# Patient Record
Sex: FEMALE | Race: WHITE | Employment: OTHER | URBAN - METROPOLITAN AREA
[De-identification: names, ages, dates, MRNs, and addresses within clinical notes are randomized per-mention and may not be internally consistent; named-entity substitution may affect disease eponyms.]

---

## 2018-06-26 ENCOUNTER — HOSPITAL ENCOUNTER (OUTPATIENT)
Age: 53
Discharge: HOME | End: 2018-06-28
Payer: COMMERCIAL

## 2018-06-26 LAB
AMPHETAMINE SCREEN, URINE: NOT DETECTED
BARBITURATE SCREEN URINE: NOT DETECTED
BENZODIAZEPINE SCREEN, URINE: NOT DETECTED
CANNABINOID SCREEN URINE: NOT DETECTED
COCAINE METABOLITE SCREEN URINE: NOT DETECTED
METHADONE SCREEN, URINE: NOT DETECTED
OPIATE SCREEN URINE: POSITIVE
PHENCYCLIDINE SCREEN URINE: NOT DETECTED
PROPOXYPHENE SCREEN: NOT DETECTED

## 2018-06-26 PROCEDURE — G0480 DRUG TEST DEF 1-7 CLASSES: HCPCS

## 2018-06-26 PROCEDURE — 80307 DRUG TEST PRSMV CHEM ANLYZR: CPT

## 2018-06-30 LAB
6AM URINE: <10 NG/ML
CODEINE, URINE: <20 NG/ML
HYDROCODONE, URINE: 464 NG/ML
HYDROMORPHONE, URINE: <20 NG/ML
MORPHINE URINE: <20 NG/ML
NORHYDROCODONE, URINE: 1439 NG/ML
NOROXYCODONE, URINE: <20 NG/ML
NOROXYMORPHONE, URINE: <20 NG/ML
OXYCODONE, URINE CONFIRMATION: <20 NG/ML
OXYMORPHONE, URINE: <20 NG/ML

## 2018-07-02 LAB
Lab: NORMAL
REPORT: NORMAL
THIS TEST SENT TO: NORMAL

## 2018-10-15 ENCOUNTER — HOSPITAL ENCOUNTER (OUTPATIENT)
Age: 53
Discharge: HOME | End: 2018-10-15
Payer: COMMERCIAL

## 2018-10-15 LAB
BUN BLDV-MCNC: 12 MG/DL (ref 6–20)
CREAT SERPL-MCNC: 0.5 MG/DL (ref 0.5–1)
GFR AFRICAN AMERICAN: >60
GFR NON-AFRICAN AMERICAN: >60 ML/MIN/1.73

## 2018-10-15 PROCEDURE — 84520 ASSAY OF UREA NITROGEN: CPT

## 2018-10-15 PROCEDURE — 36415 COLL VENOUS BLD VENIPUNCTURE: CPT

## 2018-10-15 PROCEDURE — 82565 ASSAY OF CREATININE: CPT

## 2018-10-22 ENCOUNTER — HOSPITAL ENCOUNTER (OUTPATIENT)
Age: 53
Discharge: HOME | End: 2018-10-24
Payer: COMMERCIAL

## 2018-10-22 LAB
ALBUMIN SERPL-MCNC: 4.4 G/DL (ref 3.5–5.2)
ALP BLD-CCNC: 81 U/L (ref 35–104)
ALT SERPL-CCNC: 12 U/L (ref 0–32)
AMPHETAMINE SCREEN, URINE: NOT DETECTED
AST SERPL-CCNC: 27 U/L (ref 0–31)
BARBITURATE SCREEN URINE: NOT DETECTED
BENZODIAZEPINE SCREEN, URINE: NOT DETECTED
BILIRUB SERPL-MCNC: 0.5 MG/DL (ref 0–1.2)
BILIRUBIN DIRECT: <0.2 MG/DL (ref 0–0.3)
BILIRUBIN, INDIRECT: ABNORMAL MG/DL (ref 0–1)
BUN BLDV-MCNC: 8 MG/DL (ref 6–20)
CANNABINOID SCREEN URINE: NOT DETECTED
COCAINE METABOLITE SCREEN URINE: NOT DETECTED
CREAT SERPL-MCNC: 0.5 MG/DL (ref 0.5–1)
GFR AFRICAN AMERICAN: >60
GFR NON-AFRICAN AMERICAN: >60 ML/MIN/1.73
METHADONE SCREEN, URINE: NOT DETECTED
OPIATE SCREEN URINE: NOT DETECTED
PHENCYCLIDINE SCREEN URINE: NOT DETECTED
PROPOXYPHENE SCREEN: NOT DETECTED
TOTAL PROTEIN: 8.9 G/DL (ref 6.4–8.3)

## 2018-10-22 PROCEDURE — 82565 ASSAY OF CREATININE: CPT

## 2018-10-22 PROCEDURE — 36415 COLL VENOUS BLD VENIPUNCTURE: CPT

## 2018-10-22 PROCEDURE — G0480 DRUG TEST DEF 1-7 CLASSES: HCPCS

## 2018-10-22 PROCEDURE — 80307 DRUG TEST PRSMV CHEM ANLYZR: CPT

## 2018-10-22 PROCEDURE — 80076 HEPATIC FUNCTION PANEL: CPT

## 2018-10-22 PROCEDURE — 84520 ASSAY OF UREA NITROGEN: CPT

## 2018-10-27 LAB
6AM URINE: <10 NG/ML
CODEINE, URINE: <20 NG/ML
HYDROCODONE, URINE: 155 NG/ML
HYDROMORPHONE, URINE: <20 NG/ML
MORPHINE URINE: <20 NG/ML
NORHYDROCODONE, URINE: 312 NG/ML
NOROXYCODONE, URINE: <20 NG/ML
NOROXYMORPHONE, URINE: <20 NG/ML
OXYCODONE, URINE CONFIRMATION: <20 NG/ML
OXYMORPHONE, URINE: <20 NG/ML

## 2023-04-18 ENCOUNTER — OFFICE VISIT (OUTPATIENT)
Dept: PRIMARY CARE | Facility: CLINIC | Age: 58
End: 2023-04-18
Payer: COMMERCIAL

## 2023-04-18 VITALS
SYSTOLIC BLOOD PRESSURE: 130 MMHG | WEIGHT: 159 LBS | HEART RATE: 90 BPM | HEIGHT: 69 IN | BODY MASS INDEX: 23.55 KG/M2 | DIASTOLIC BLOOD PRESSURE: 80 MMHG | TEMPERATURE: 99.2 F | OXYGEN SATURATION: 98 % | RESPIRATION RATE: 16 BRPM

## 2023-04-18 DIAGNOSIS — G61.81 CHRONIC INFLAMMATORY DEMYELINATING POLYNEUROPATHY (MULTI): Primary | ICD-10-CM

## 2023-04-18 DIAGNOSIS — J40 BRONCHITIS, COMPLICATED: ICD-10-CM

## 2023-04-18 DIAGNOSIS — R91.8 ABNORMAL CT SCAN OF LUNG: ICD-10-CM

## 2023-04-18 DIAGNOSIS — F17.210 CIGARETTE NICOTINE DEPENDENCE WITHOUT COMPLICATION: ICD-10-CM

## 2023-04-18 DIAGNOSIS — M06.9 RHEUMATOID ARTHRITIS INVOLVING MULTIPLE SITES, UNSPECIFIED WHETHER RHEUMATOID FACTOR PRESENT (MULTI): ICD-10-CM

## 2023-04-18 DIAGNOSIS — F11.20 UNCOMPLICATED OPIOID DEPENDENCE (MULTI): ICD-10-CM

## 2023-04-18 PROBLEM — M25.50 ARTHRALGIA: Status: ACTIVE | Noted: 2023-04-18

## 2023-04-18 PROBLEM — E55.9 VITAMIN D DEFICIENCY: Status: ACTIVE | Noted: 2023-04-18

## 2023-04-18 PROBLEM — I10 HYPERTENSION: Status: ACTIVE | Noted: 2023-04-18

## 2023-04-18 PROBLEM — E78.2 COMBINED HYPERLIPIDEMIA: Status: ACTIVE | Noted: 2023-04-18

## 2023-04-18 PROBLEM — J45.909 ASTHMA (HHS-HCC): Status: ACTIVE | Noted: 2023-04-18

## 2023-04-18 PROBLEM — M85.80 OSTEOPENIA: Status: ACTIVE | Noted: 2023-04-18

## 2023-04-18 PROBLEM — E78.5 INCREASED SERUM LIPIDS: Status: ACTIVE | Noted: 2023-04-18

## 2023-04-18 PROBLEM — F33.9 DEPRESSION, RECURRENT (CMS-HCC): Status: ACTIVE | Noted: 2023-04-18

## 2023-04-18 PROBLEM — M54.2 NECK PAIN: Status: ACTIVE | Noted: 2023-04-18

## 2023-04-18 PROBLEM — F41.9 ANXIETY: Status: ACTIVE | Noted: 2023-04-18

## 2023-04-18 PROBLEM — M79.671 BILATERAL FOOT PAIN: Status: ACTIVE | Noted: 2023-04-18

## 2023-04-18 PROBLEM — M79.672 BILATERAL FOOT PAIN: Status: ACTIVE | Noted: 2023-04-18

## 2023-04-18 PROCEDURE — 4004F PT TOBACCO SCREEN RCVD TLK: CPT | Performed by: FAMILY MEDICINE

## 2023-04-18 PROCEDURE — 3079F DIAST BP 80-89 MM HG: CPT | Performed by: FAMILY MEDICINE

## 2023-04-18 PROCEDURE — 3075F SYST BP GE 130 - 139MM HG: CPT | Performed by: FAMILY MEDICINE

## 2023-04-18 PROCEDURE — 99214 OFFICE O/P EST MOD 30 MIN: CPT | Performed by: FAMILY MEDICINE

## 2023-04-18 RX ORDER — HYDROCODONE BITARTRATE AND ACETAMINOPHEN 7.5; 325 MG/1; MG/1
1 TABLET ORAL EVERY 6 HOURS PRN
Qty: 120 TABLET | Refills: 0 | Status: SHIPPED | OUTPATIENT
Start: 2023-04-20 | End: 2023-04-25 | Stop reason: SDUPTHER

## 2023-04-18 RX ORDER — ALBUTEROL SULFATE 90 UG/1
2 AEROSOL, METERED RESPIRATORY (INHALATION)
COMMUNITY
Start: 2022-06-01

## 2023-04-18 RX ORDER — PREDNISONE 5 MG/1
1 TABLET ORAL 3 TIMES DAILY
COMMUNITY
Start: 2022-05-05 | End: 2023-07-12 | Stop reason: ENTERED-IN-ERROR

## 2023-04-18 RX ORDER — CYCLOBENZAPRINE HCL 10 MG
10 TABLET ORAL 2 TIMES DAILY PRN
COMMUNITY
End: 2023-04-24

## 2023-04-18 RX ORDER — HYDROCODONE BITARTRATE AND ACETAMINOPHEN 7.5; 325 MG/1; MG/1
1 TABLET ORAL EVERY 6 HOURS PRN
COMMUNITY
End: 2023-04-18 | Stop reason: SDUPTHER

## 2023-04-18 RX ORDER — HYDROCODONE BITARTRATE AND ACETAMINOPHEN 7.5; 325 MG/1; MG/1
1 TABLET ORAL EVERY 6 HOURS PRN
Qty: 120 TABLET | Refills: 0 | Status: SHIPPED | OUTPATIENT
Start: 2023-05-20 | End: 2023-06-19

## 2023-04-18 RX ORDER — NALOXONE HYDROCHLORIDE 4 MG/.1ML
4 SPRAY NASAL AS NEEDED
Qty: 1 EACH | Refills: 0 | Status: SHIPPED | OUTPATIENT
Start: 2023-04-18

## 2023-04-18 RX ORDER — LOSARTAN POTASSIUM AND HYDROCHLOROTHIAZIDE 25; 100 MG/1; MG/1
1 TABLET ORAL DAILY
COMMUNITY
End: 2023-08-11

## 2023-04-18 RX ORDER — ACETAMINOPHEN 500 MG
50 TABLET ORAL DAILY
COMMUNITY
End: 2023-10-13 | Stop reason: WASHOUT

## 2023-04-18 RX ORDER — ALBUTEROL SULFATE 0.63 MG/3ML
0.63 SOLUTION RESPIRATORY (INHALATION) EVERY 4 HOURS PRN
COMMUNITY
Start: 2013-08-09 | End: 2023-10-13 | Stop reason: WASHOUT

## 2023-04-18 RX ORDER — AMLODIPINE BESYLATE 5 MG/1
1 TABLET ORAL DAILY
COMMUNITY
Start: 2023-02-01 | End: 2023-07-25

## 2023-04-18 RX ORDER — BUPROPION HYDROCHLORIDE 150 MG/1
150 TABLET ORAL EVERY MORNING
Qty: 30 TABLET | Refills: 5 | Status: SHIPPED | OUTPATIENT
Start: 2023-04-18 | End: 2023-05-15

## 2023-04-18 RX ORDER — HYDROCODONE BITARTRATE AND ACETAMINOPHEN 7.5; 325 MG/1; MG/1
1 TABLET ORAL EVERY 6 HOURS PRN
Qty: 120 TABLET | Refills: 0 | Status: SHIPPED | OUTPATIENT
Start: 2023-06-19 | End: 2023-06-26 | Stop reason: SDUPTHER

## 2023-04-18 RX ORDER — HYDROCHLOROTHIAZIDE 25 MG/1
1 TABLET ORAL DAILY
COMMUNITY
Start: 2022-05-16 | End: 2023-10-13 | Stop reason: WASHOUT

## 2023-04-18 RX ORDER — CEPHALEXIN 250 MG/1
1 CAPSULE ORAL
COMMUNITY
Start: 2013-08-09

## 2023-04-18 RX ORDER — EPINEPHRINE 0.3 MG/.3ML
1 INJECTION SUBCUTANEOUS AS NEEDED
COMMUNITY
Start: 2018-01-30

## 2023-04-18 ASSESSMENT — ENCOUNTER SYMPTOMS
WHEEZING: 1
BACK PAIN: 1
COUGH: 1
SHORTNESS OF BREATH: 1
ARTHRALGIAS: 1

## 2023-04-18 NOTE — PROGRESS NOTES
Subjective   Patient ID: La Donnelly is a 57 y.o. female.    Nicotine Dependence      57year old female for follow up. Discussed smoking cessation, Has days of not sleeping, pain 3-4.Meds reconciled.   OARRS:  Laura Ward MD on 4/18/2023 10:49 AM  I have personally reviewed the OARRS report for La Donnelly. I have considered the risks of abuse, dependence, addiction and diversion and I believe that it is clinically appropriate for La Donnelly to be prescribed this medication    Is the patient prescribed a combination of a benzodiazepine and opioid?  No    Last Urine Drug Screen / ordered today: Yes  Recent Results (from the past 12643 hour(s))   OPIATE/OPIOID/BENZO PRESCRIPTION COMPLIANCE    Collection Time: 02/01/23 10:47 AM   Result Value Ref Range    DRUG SCREEN COMMENT URINE SEE BELOW     Creatine, Urine 26.1 mg/dL    Amphetamine Screen, Urine PRESUMPTIVE NEGATIVE NEGATIVE    Barbiturate Screen, Urine PRESUMPTIVE NEGATIVE NEGATIVE    Cannabinoid Screen, Urine PRESUMPTIVE NEGATIVE NEGATIVE    Cocaine Screen, Urine PRESUMPTIVE NEGATIVE NEGATIVE    PCP Screen, Urine PRESUMPTIVE NEGATIVE NEGATIVE    7-Aminoclonazepam <25 Cutoff <25 ng/mL    Alpha-Hydroxyalprazolam <25 Cutoff <25 ng/mL    Alpha-Hydroxymidazolam <25 Cutoff <25 ng/mL    Alprazolam <25 Cutoff <25 ng/mL    Chlordiazepoxide <25 Cutoff <25 ng/mL    Clonazepam <25 Cutoff <25 ng/mL    Diazepam <25 Cutoff <25 ng/mL    Lorazepam <25 Cutoff <25 ng/mL    Midazolam <25 Cutoff <25 ng/mL    Nordiazepam <25 Cutoff <25 ng/mL    Oxazepam <25 Cutoff <25 ng/mL    Temazepam <25 Cutoff <25 ng/mL    Zolpidem <25 Cutoff <25 ng/mL    Zolpidem Metabolite (ZCA) <25 Cutoff <25 ng/mL    6-Acetylmorphine <25 Cutoff <25 ng/mL    Codeine <50 Cutoff <50 ng/mL    Hydrocodone 904 (A) Cutoff <25 ng/mL    Hydromorphone 663 (A) Cutoff <25 ng/mL    Morphine Urine <50 Cutoff <50 ng/mL    Norhydrocodone 950 (A) Cutoff <25 ng/mL    Noroxycodone <25 Cutoff <25 ng/mL     Oxycodone <25 Cutoff <25 ng/mL    Oxymorphone <25 Cutoff <25 ng/mL    Tramadol <50 Cutoff <50 ng/mL    O-Desmethyltramadol <50 Cutoff <50 ng/mL    Fentanyl <2.5 Cutoff<2.5 ng/mL    Norfentanyl <2.5 Cutoff<2.5 ng/mL    METHADONE CONFIRMATION,URINE <25 Cutoff <25 ng/mL    EDDP <25 Cutoff <25 ng/mL     Results are as expected.     Controlled Substance Agreement:  Date of the Last Agreement: 02/07/2023  Reviewed Controlled Substance Agreement including but not limited to the benefits, risks, and alternatives to treatment with a Controlled Substance medication(s).    Opioids:  What is the patient's goal of therapy? Improved pain  Is this being achieved with current treatment? yes    I have calculated the patient's Morphine Dose Equivalent (MED):   I have considered referral to Pain Management and/or a specialist, and do not feel it is necessary at this time.    I feel that it is clinically indicated to continue this current medication regimen after consideration of alternative therapies, and other non-opioid treatment.    Opioid Risk Screening:  No data recorded    Pain Assessment: 3/10    Review of Systems   Respiratory:  Positive for cough, shortness of breath and wheezing.    Musculoskeletal:  Positive for arthralgias and back pain.   All other systems reviewed and are negative.      Objective   Physical Exam  Vitals reviewed.   Constitutional:       Appearance: Normal appearance.   HENT:      Head: Normocephalic and atraumatic.      Right Ear: Tympanic membrane normal.      Left Ear: Tympanic membrane normal.      Nose: Nose normal.      Mouth/Throat:      Mouth: Mucous membranes are moist.      Pharynx: Oropharynx is clear.   Eyes:      Extraocular Movements: Extraocular movements intact.      Conjunctiva/sclera: Conjunctivae normal.      Pupils: Pupils are equal, round, and reactive to light.   Cardiovascular:      Rate and Rhythm: Normal rate and regular rhythm.      Pulses: Normal pulses.      Heart sounds:  Normal heart sounds.   Pulmonary:      Effort: Pulmonary effort is normal.      Breath sounds: Wheezing and rhonchi present.   Abdominal:      General: Abdomen is flat. Bowel sounds are normal.      Palpations: Abdomen is soft.   Musculoskeletal:         General: Normal range of motion.      Cervical back: Normal range of motion and neck supple.   Skin:     General: Skin is warm and dry.      Capillary Refill: Capillary refill takes less than 2 seconds.   Neurological:      General: No focal deficit present.      Mental Status: She is alert and oriented to person, place, and time.   Psychiatric:         Mood and Affect: Mood normal.         Behavior: Behavior normal.         Assessment/Plan   Diagnoses and all orders for this visit:  Chronic inflammatory demyelinating polyneuropathy (CMS/HCC)  -     HYDROcodone-acetaminophen (Norco) 7.5-325 mg tablet; Take 1 tablet by mouth every 6 hours if needed for severe pain (7 - 10). Do not start before April 20, 2023.  -     HYDROcodone-acetaminophen (Norco) 7.5-325 mg tablet; Take 1 tablet by mouth every 6 hours if needed for severe pain (7 - 10). Do not start before May 20, 2023.  -     HYDROcodone-acetaminophen (Norco) 7.5-325 mg tablet; Take 1 tablet by mouth every 6 hours if needed for severe pain (7 - 10). Do not start before June 19, 2023.  Cigarette nicotine dependence without complication  -     buPROPion XL (Wellbutrin XL) 150 mg 24 hr tablet; Take 1 tablet (150 mg) by mouth once daily in the morning. Do not crush, chew, or split.  Uncomplicated opioid dependence (CMS/HCC)  -     naloxone (Narcan) 4 mg/0.1 mL nasal spray; Administer 1 spray (4 mg) into affected nostril(s) if needed for opioid reversal for up to 1 dose. May repeat every 2-3 minutes if needed, alternating nostrils, until medical assistance becomes available.  Bronchitis, complicated  Abnormal CT scan of lung  Rheumatoid arthritis involving multiple sites, unspecified whether rheumatoid factor present  (CMS/[facility])    Discussed smoking cessation, try lozenges or patches at higher dose to start, then wean over time, 1-2 weeks at each dose. Bupropion sent, information given. Good luck.     Colonoscopy due this summer.

## 2023-04-18 NOTE — PATIENT INSTRUCTIONS
Assessment/Plan   Diagnoses and all orders for this visit:  Chronic inflammatory demyelinating polyneuropathy (CMS/HCC)  -     HYDROcodone-acetaminophen (Norco) 7.5-325 mg tablet; Take 1 tablet by mouth every 6 hours if needed for severe pain (7 - 10). Do not start before April 20, 2023.  -     HYDROcodone-acetaminophen (Norco) 7.5-325 mg tablet; Take 1 tablet by mouth every 6 hours if needed for severe pain (7 - 10). Do not start before May 20, 2023.  -     HYDROcodone-acetaminophen (Norco) 7.5-325 mg tablet; Take 1 tablet by mouth every 6 hours if needed for severe pain (7 - 10). Do not start before June 19, 2023.  Cigarette nicotine dependence without complication  -     buPROPion XL (Wellbutrin XL) 150 mg 24 hr tablet; Take 1 tablet (150 mg) by mouth once daily in the morning. Do not crush, chew, or split.  Uncomplicated opioid dependence (CMS/[facility])  -     naloxone (Narcan) 4 mg/0.1 mL nasal spray; Administer 1 spray (4 mg) into affected nostril(s) if needed for opioid reversal for up to 1 dose. May repeat every 2-3 minutes if needed, alternating nostrils, until medical assistance becomes available.  Bronchitis, complicated  Abnormal CT scan of lung  Rheumatoid arthritis involving multiple sites, unspecified whether rheumatoid factor present (CMS/[facility])    Discussed smoking cessation, try lozenges or patches at higher dose to start, then wean over time, 1-2 weeks at each dose. Bupropion sent, information given. Good luck.     Colonoscopy due this summer.

## 2023-04-21 DIAGNOSIS — M54.2 CERVICALGIA: ICD-10-CM

## 2023-04-24 ENCOUNTER — TELEPHONE (OUTPATIENT)
Dept: PRIMARY CARE | Facility: CLINIC | Age: 58
End: 2023-04-24
Payer: MEDICARE

## 2023-04-24 DIAGNOSIS — G61.81 CHRONIC INFLAMMATORY DEMYELINATING POLYNEUROPATHY (MULTI): ICD-10-CM

## 2023-04-24 PROBLEM — F17.210 CIGARETTE SMOKER: Status: ACTIVE | Noted: 2022-06-21

## 2023-04-24 PROBLEM — M21.541 ACQUIRED EQUINOVARUS DEFORMITY OF RIGHT FOOT: Status: ACTIVE | Noted: 2023-04-24

## 2023-04-24 PROBLEM — J44.9 CHRONIC OBSTRUCTIVE LUNG DISEASE (MULTI): Status: ACTIVE | Noted: 2022-06-21

## 2023-04-24 PROBLEM — R29.898 LEG WEAKNESS: Status: ACTIVE | Noted: 2023-04-24

## 2023-04-24 PROBLEM — J45.909 ASTHMA WITHOUT STATUS ASTHMATICUS (HHS-HCC): Status: ACTIVE | Noted: 2022-06-21

## 2023-04-24 PROBLEM — J18.9 PNEUMONIA: Status: ACTIVE | Noted: 2022-11-28

## 2023-04-24 PROBLEM — R93.89 ABNORMAL COMPUTERIZED AXIAL TOMOGRAPHY OF CHEST: Status: ACTIVE | Noted: 2022-06-21

## 2023-04-24 PROBLEM — M54.10 RADICULAR PAIN: Status: ACTIVE | Noted: 2023-04-24

## 2023-04-24 PROBLEM — M21.542 ACQUIRED EQUINOVARUS DEFORMITY OF LEFT FOOT: Status: ACTIVE | Noted: 2023-04-24

## 2023-04-24 RX ORDER — CYCLOBENZAPRINE HCL 10 MG
TABLET ORAL
Qty: 180 TABLET | Refills: 1 | Status: SHIPPED | OUTPATIENT
Start: 2023-04-24 | End: 2024-04-02 | Stop reason: SDUPTHER

## 2023-04-24 NOTE — TELEPHONE ENCOUNTER
Pt cannot find pharmacy that has [place] in stock. Was supposed to be filled last Thurs.   Please advise.

## 2023-04-25 RX ORDER — HYDROCODONE BITARTRATE AND ACETAMINOPHEN 7.5; 325 MG/1; MG/1
1 TABLET ORAL EVERY 6 HOURS PRN
Qty: 120 TABLET | Refills: 0 | Status: SHIPPED | OUTPATIENT
Start: 2023-04-25 | End: 2023-05-25

## 2023-05-13 DIAGNOSIS — F17.210 CIGARETTE NICOTINE DEPENDENCE WITHOUT COMPLICATION: ICD-10-CM

## 2023-05-15 RX ORDER — BUPROPION HYDROCHLORIDE 150 MG/1
150 TABLET ORAL EVERY MORNING
Qty: 30 TABLET | Refills: 5 | Status: SHIPPED | OUTPATIENT
Start: 2023-05-15 | End: 2023-10-13 | Stop reason: WASHOUT

## 2023-06-26 ENCOUNTER — TELEPHONE (OUTPATIENT)
Dept: PRIMARY CARE | Facility: CLINIC | Age: 58
End: 2023-06-26
Payer: MEDICARE

## 2023-06-26 DIAGNOSIS — G61.81 CHRONIC INFLAMMATORY DEMYELINATING POLYNEUROPATHY (MULTI): ICD-10-CM

## 2023-06-26 RX ORDER — HYDROCODONE BITARTRATE AND ACETAMINOPHEN 7.5; 325 MG/1; MG/1
1 TABLET ORAL EVERY 6 HOURS PRN
Qty: 120 TABLET | Refills: 0 | Status: SHIPPED | OUTPATIENT
Start: 2023-06-26 | End: 2023-07-12 | Stop reason: SDUPTHER

## 2023-07-12 ENCOUNTER — OFFICE VISIT (OUTPATIENT)
Dept: PRIMARY CARE | Facility: CLINIC | Age: 58
End: 2023-07-12
Payer: MEDICARE

## 2023-07-12 VITALS
BODY MASS INDEX: 23.67 KG/M2 | HEIGHT: 69 IN | OXYGEN SATURATION: 97 % | SYSTOLIC BLOOD PRESSURE: 138 MMHG | HEART RATE: 69 BPM | WEIGHT: 159.8 LBS | TEMPERATURE: 97.7 F | RESPIRATION RATE: 16 BRPM | DIASTOLIC BLOOD PRESSURE: 78 MMHG

## 2023-07-12 DIAGNOSIS — F17.210 CIGARETTE NICOTINE DEPENDENCE WITHOUT COMPLICATION: ICD-10-CM

## 2023-07-12 DIAGNOSIS — R29.898 WEAKNESS OF BOTH LOWER EXTREMITIES: ICD-10-CM

## 2023-07-12 DIAGNOSIS — M79.672 BILATERAL FOOT PAIN: ICD-10-CM

## 2023-07-12 DIAGNOSIS — I10 PRIMARY HYPERTENSION: ICD-10-CM

## 2023-07-12 DIAGNOSIS — F17.211 NICOTINE DEPENDENCE, CIGARETTES, IN REMISSION: ICD-10-CM

## 2023-07-12 DIAGNOSIS — E78.2 COMBINED HYPERLIPIDEMIA: Primary | ICD-10-CM

## 2023-07-12 DIAGNOSIS — G61.81 CHRONIC INFLAMMATORY DEMYELINATING POLYNEUROPATHY (MULTI): ICD-10-CM

## 2023-07-12 DIAGNOSIS — Z12.31 VISIT FOR SCREENING MAMMOGRAM: ICD-10-CM

## 2023-07-12 DIAGNOSIS — F11.20 UNCOMPLICATED OPIOID DEPENDENCE (MULTI): ICD-10-CM

## 2023-07-12 DIAGNOSIS — M25.50 ARTHRALGIA, UNSPECIFIED JOINT: ICD-10-CM

## 2023-07-12 DIAGNOSIS — M79.671 BILATERAL FOOT PAIN: ICD-10-CM

## 2023-07-12 DIAGNOSIS — J30.1 SEASONAL ALLERGIC RHINITIS DUE TO POLLEN: ICD-10-CM

## 2023-07-12 DIAGNOSIS — F41.9 ANXIETY: ICD-10-CM

## 2023-07-12 DIAGNOSIS — M06.9 RHEUMATOID ARTHRITIS INVOLVING MULTIPLE SITES, UNSPECIFIED WHETHER RHEUMATOID FACTOR PRESENT (MULTI): ICD-10-CM

## 2023-07-12 DIAGNOSIS — E55.9 VITAMIN D DEFICIENCY: ICD-10-CM

## 2023-07-12 DIAGNOSIS — J44.9 CHRONIC OBSTRUCTIVE PULMONARY DISEASE, UNSPECIFIED COPD TYPE (MULTI): ICD-10-CM

## 2023-07-12 DIAGNOSIS — J45.30 MILD PERSISTENT ASTHMA, UNSPECIFIED WHETHER COMPLICATED (HHS-HCC): ICD-10-CM

## 2023-07-12 DIAGNOSIS — F33.9 DEPRESSION, RECURRENT (CMS-HCC): ICD-10-CM

## 2023-07-12 PROCEDURE — 3078F DIAST BP <80 MM HG: CPT | Performed by: FAMILY MEDICINE

## 2023-07-12 PROCEDURE — 4004F PT TOBACCO SCREEN RCVD TLK: CPT | Performed by: FAMILY MEDICINE

## 2023-07-12 PROCEDURE — 99214 OFFICE O/P EST MOD 30 MIN: CPT | Performed by: FAMILY MEDICINE

## 2023-07-12 PROCEDURE — 3075F SYST BP GE 130 - 139MM HG: CPT | Performed by: FAMILY MEDICINE

## 2023-07-12 RX ORDER — HYDROCODONE BITARTRATE AND ACETAMINOPHEN 7.5; 325 MG/1; MG/1
1 TABLET ORAL EVERY 6 HOURS PRN
Qty: 120 TABLET | Refills: 0 | Status: SHIPPED | OUTPATIENT
Start: 2023-08-25 | End: 2023-09-24

## 2023-07-12 RX ORDER — PREDNISONE 1 MG/1
3 TABLET ORAL DAILY
Qty: 90 TABLET | Refills: 2 | Status: SHIPPED | OUTPATIENT
Start: 2023-07-12 | End: 2023-10-10

## 2023-07-12 RX ORDER — HYDROCODONE BITARTRATE AND ACETAMINOPHEN 7.5; 325 MG/1; MG/1
1 TABLET ORAL EVERY 6 HOURS PRN
Qty: 120 TABLET | Refills: 0 | Status: SHIPPED | OUTPATIENT
Start: 2023-09-24 | End: 2023-10-13 | Stop reason: SDUPTHER

## 2023-07-12 RX ORDER — HYDROCODONE BITARTRATE AND ACETAMINOPHEN 7.5; 325 MG/1; MG/1
1 TABLET ORAL EVERY 6 HOURS PRN
Qty: 120 TABLET | Refills: 0 | Status: SHIPPED | OUTPATIENT
Start: 2023-07-26 | End: 2023-08-25

## 2023-07-12 ASSESSMENT — PATIENT HEALTH QUESTIONNAIRE - PHQ9
2. FEELING DOWN, DEPRESSED OR HOPELESS: NOT AT ALL
1. LITTLE INTEREST OR PLEASURE IN DOING THINGS: NOT AT ALL
SUM OF ALL RESPONSES TO PHQ9 QUESTIONS 1 & 2: 0

## 2023-07-12 ASSESSMENT — LIFESTYLE VARIABLES
HOW MANY STANDARD DRINKS CONTAINING ALCOHOL DO YOU HAVE ON A TYPICAL DAY: PATIENT DOES NOT DRINK
SKIP TO QUESTIONS 9-10: 1
AUDIT-C TOTAL SCORE: 0
HOW OFTEN DO YOU HAVE SIX OR MORE DRINKS ON ONE OCCASION: NEVER
HOW OFTEN DO YOU HAVE A DRINK CONTAINING ALCOHOL: NEVER

## 2023-07-12 ASSESSMENT — ENCOUNTER SYMPTOMS
OCCASIONAL FEELINGS OF UNSTEADINESS: 0
MYALGIAS: 1
ARTHRALGIAS: 1
LOSS OF SENSATION IN FEET: 1
DEPRESSION: 0

## 2023-07-12 NOTE — PATIENT INSTRUCTIONS
Diagnoses and all orders for this visit:  Combined hyperlipidemia  -     BI mammo bilateral screening tomosynthesis; Future  -     CT lung screening low dose; Future  -     CBC and Auto Differential; Future  -     Comprehensive Metabolic Panel; Future  -     Lipid Panel; Future  -     TSH with reflex to Free T4 if abnormal; Future  Primary hypertension  -     BI mammo bilateral screening tomosynthesis; Future  -     CT lung screening low dose; Future  -     CBC and Auto Differential; Future  -     Comprehensive Metabolic Panel; Future  -     Lipid Panel; Future  -     TSH with reflex to Free T4 if abnormal; Future  Seasonal allergic rhinitis due to pollen  Vitamin D deficiency  Depression, recurrent (CMS/HCC)  Anxiety  Uncomplicated opioid dependence (CMS/HCC)  Rheumatoid arthritis involving multiple sites, unspecified whether rheumatoid factor present (CMS/HCC)  -     predniSONE 1 mg tablet,delayed release (DR/EC); Take 3 mg by mouth once daily.  -     BI mammo bilateral screening tomosynthesis; Future  -     CT lung screening low dose; Future  -     CBC and Auto Differential; Future  -     Comprehensive Metabolic Panel; Future  -     Lipid Panel; Future  -     TSH with reflex to Free T4 if abnormal; Future  Bilateral foot pain  -     HYDROcodone-acetaminophen (Norco) 7.5-325 mg tablet; Take 1 tablet by mouth every 6 hours if needed for severe pain (7 - 10). Do not start before August 25, 2023.  -     HYDROcodone-acetaminophen (Norco) 7.5-325 mg tablet; Take 1 tablet by mouth every 6 hours if needed for severe pain (7 - 10). Do not start before September 24, 2023.  Arthralgia, unspecified joint  -     HYDROcodone-acetaminophen (Norco) 7.5-325 mg tablet; Take 1 tablet by mouth every 6 hours if needed for severe pain (7 - 10). Do not start before August 25, 2023.  -     HYDROcodone-acetaminophen (Norco) 7.5-325 mg tablet; Take 1 tablet by mouth every 6 hours if needed for severe pain (7 - 10). Do not start before  September 24, 2023.  Weakness of both lower extremities  -     HYDROcodone-acetaminophen (Norco) 7.5-325 mg tablet; Take 1 tablet by mouth every 6 hours if needed for severe pain (7 - 10). Do not start before August 25, 2023.  -     HYDROcodone-acetaminophen (Norco) 7.5-325 mg tablet; Take 1 tablet by mouth every 6 hours if needed for severe pain (7 - 10). Do not start before September 24, 2023.  Chronic inflammatory demyelinating polyneuropathy (CMS/HCC)  -     predniSONE 1 mg tablet,delayed release (DR/EC); Take 3 mg by mouth once daily.  -     HYDROcodone-acetaminophen (Norco) 7.5-325 mg tablet; Take 1 tablet by mouth every 6 hours if needed for severe pain (7 - 10). Do not start before July 26, 2023.  -     HYDROcodone-acetaminophen (Norco) 7.5-325 mg tablet; Take 1 tablet by mouth every 6 hours if needed for severe pain (7 - 10). Do not start before August 25, 2023.  -     HYDROcodone-acetaminophen (Norco) 7.5-325 mg tablet; Take 1 tablet by mouth every 6 hours if needed for severe pain (7 - 10). Do not start before September 24, 2023.  Mild persistent asthma, unspecified whether complicated  Chronic obstructive pulmonary disease, unspecified COPD type (CMS/HCC)  -     BI mammo bilateral screening tomosynthesis; Future  -     CT lung screening low dose; Future  -     CBC and Auto Differential; Future  -     Comprehensive Metabolic Panel; Future  -     Lipid Panel; Future  -     TSH with reflex to Free T4 if abnormal; Future  Cigarette nicotine dependence without complication  Nicotine dependence, cigarettes, in remission  -     CT lung screening low dose; Future  Visit for screening mammogram  -     BI mammo bilateral screening tomosynthesis; Future    Schedule colonoscopy at your convenience.

## 2023-07-12 NOTE — PROGRESS NOTES
OARRS:  Laura Ward MD on 7/12/2023 10:46 AM  I have personally reviewed the OARRS report for La Donnelly. I have considered the risks of abuse, dependence, addiction and diversion and I believe that it is clinically appropriate for La Donnelly to be prescribed this medication    Is the patient prescribed a combination of a benzodiazepine and opioid?  Yes, I feel it is clincially indicated to continue the medication and have discussed with the patient risks/benefits/alternatives.    Last Urine Drug Screen / ordered today: Yes  Recent Results (from the past 31610 hour(s))   OPIATE/OPIOID/BENZO PRESCRIPTION COMPLIANCE    Collection Time: 02/01/23 10:47 AM   Result Value Ref Range    DRUG SCREEN COMMENT URINE SEE BELOW     Creatine, Urine 26.1 mg/dL    Amphetamine Screen, Urine PRESUMPTIVE NEGATIVE NEGATIVE    Barbiturate Screen, Urine PRESUMPTIVE NEGATIVE NEGATIVE    Cannabinoid Screen, Urine PRESUMPTIVE NEGATIVE NEGATIVE    Cocaine Screen, Urine PRESUMPTIVE NEGATIVE NEGATIVE    PCP Screen, Urine PRESUMPTIVE NEGATIVE NEGATIVE    7-Aminoclonazepam <25 Cutoff <25 ng/mL    Alpha-Hydroxyalprazolam <25 Cutoff <25 ng/mL    Alpha-Hydroxymidazolam <25 Cutoff <25 ng/mL    Alprazolam <25 Cutoff <25 ng/mL    Chlordiazepoxide <25 Cutoff <25 ng/mL    Clonazepam <25 Cutoff <25 ng/mL    Diazepam <25 Cutoff <25 ng/mL    Lorazepam <25 Cutoff <25 ng/mL    Midazolam <25 Cutoff <25 ng/mL    Nordiazepam <25 Cutoff <25 ng/mL    Oxazepam <25 Cutoff <25 ng/mL    Temazepam <25 Cutoff <25 ng/mL    Zolpidem <25 Cutoff <25 ng/mL    Zolpidem Metabolite (ZCA) <25 Cutoff <25 ng/mL    6-Acetylmorphine <25 Cutoff <25 ng/mL    Codeine <50 Cutoff <50 ng/mL    Hydrocodone 904 (A) Cutoff <25 ng/mL    Hydromorphone 663 (A) Cutoff <25 ng/mL    Morphine Urine <50 Cutoff <50 ng/mL    Norhydrocodone 950 (A) Cutoff <25 ng/mL    Noroxycodone <25 Cutoff <25 ng/mL    Oxycodone <25 Cutoff <25 ng/mL    Oxymorphone <25 Cutoff <25 ng/mL    Tramadol <50  Cutoff <50 ng/mL    O-Desmethyltramadol <50 Cutoff <50 ng/mL    Fentanyl <2.5 Cutoff<2.5 ng/mL    Norfentanyl <2.5 Cutoff<2.5 ng/mL    METHADONE CONFIRMATION,URINE <25 Cutoff <25 ng/mL    EDDP <25 Cutoff <25 ng/mL     Results are as expected.     Controlled Substance Agreement:  Date of the Last Agreement: 04/18/2023  Reviewed Controlled Substance Agreement including but not limited to the benefits, risks, and alternatives to treatment with a Controlled Substance medication(s).    Opioids:  What is the patient's goal of therapy? Improved pain  Is this being achieved with current treatment? yes    I have calculated the patient's Morphine Dose Equivalent (MED):   I have considered referral to Pain Management and/or a specialist, and do not feel it is necessary at this time.    I feel that it is clinically indicated to continue this current medication regimen after consideration of alternative therapies, and other non-opioid treatment.    Opioid Risk Screening:  No data recorded    Pain Assessment:  3-4/10 most times  Subjective   Patient ID: La Donnelly is a 57 y.o. female.    Med Refill  Associated symptoms include arthralgias and myalgias.     57 year old female for follow up. She is having difficulty with both legs bowing in, and elbows to fingertips, Pain 3-4, [facility] has medications.   Review of Systems   Musculoskeletal:  Positive for arthralgias, gait problem and myalgias.   All other systems reviewed and are negative.      Objective   Physical Exam  Vitals reviewed.   Constitutional:       Appearance: Normal appearance.   HENT:      Head: Normocephalic and atraumatic.      Right Ear: Tympanic membrane normal.      Left Ear: Tympanic membrane normal.      Nose: Nose normal.      Mouth/Throat:      Mouth: Mucous membranes are moist.      Pharynx: Oropharynx is clear.   Eyes:      Extraocular Movements: Extraocular movements intact.      Conjunctiva/sclera: Conjunctivae normal.      Pupils: Pupils are equal,  round, and reactive to light.   Cardiovascular:      Rate and Rhythm: Normal rate and regular rhythm.      Pulses: Normal pulses.      Heart sounds: Normal heart sounds.   Pulmonary:      Effort: Pulmonary effort is normal.      Breath sounds: Normal breath sounds.   Abdominal:      General: Abdomen is flat. Bowel sounds are normal.      Palpations: Abdomen is soft.   Musculoskeletal:         General: Normal range of motion.      Cervical back: Normal range of motion and neck supple.   Skin:     General: Skin is warm and dry.      Capillary Refill: Capillary refill takes less than 2 seconds.   Neurological:      General: No focal deficit present.      Mental Status: She is alert and oriented to person, place, and time.   Psychiatric:         Mood and Affect: Mood normal.         Behavior: Behavior normal.         Assessment/Plan   Diagnoses and all orders for this visit:  Combined hyperlipidemia  -     BI mammo bilateral screening tomosynthesis; Future  -     CT lung screening low dose; Future  -     CBC and Auto Differential; Future  -     Comprehensive Metabolic Panel; Future  -     Lipid Panel; Future  -     TSH with reflex to Free T4 if abnormal; Future  Primary hypertension  -     BI mammo bilateral screening tomosynthesis; Future  -     CT lung screening low dose; Future  -     CBC and Auto Differential; Future  -     Comprehensive Metabolic Panel; Future  -     Lipid Panel; Future  -     TSH with reflex to Free T4 if abnormal; Future  Seasonal allergic rhinitis due to pollen  Vitamin D deficiency  Depression, recurrent (CMS/HCC)  Anxiety  Uncomplicated opioid dependence (CMS/HCC)  Rheumatoid arthritis involving multiple sites, unspecified whether rheumatoid factor present (CMS/HCC)  -     predniSONE 1 mg tablet,delayed release (DR/EC); Take 3 mg by mouth once daily.  -     BI mammo bilateral screening tomosynthesis; Future  -     CT lung screening low dose; Future  -     CBC and Auto Differential; Future  -      Comprehensive Metabolic Panel; Future  -     Lipid Panel; Future  -     TSH with reflex to Free T4 if abnormal; Future  Bilateral foot pain  -     HYDROcodone-acetaminophen (Norco) 7.5-325 mg tablet; Take 1 tablet by mouth every 6 hours if needed for severe pain (7 - 10). Do not start before August 25, 2023.  -     HYDROcodone-acetaminophen (Norco) 7.5-325 mg tablet; Take 1 tablet by mouth every 6 hours if needed for severe pain (7 - 10). Do not start before September 24, 2023.  Arthralgia, unspecified joint  -     HYDROcodone-acetaminophen (Norco) 7.5-325 mg tablet; Take 1 tablet by mouth every 6 hours if needed for severe pain (7 - 10). Do not start before August 25, 2023.  -     HYDROcodone-acetaminophen (Norco) 7.5-325 mg tablet; Take 1 tablet by mouth every 6 hours if needed for severe pain (7 - 10). Do not start before September 24, 2023.  Weakness of both lower extremities  -     HYDROcodone-acetaminophen (Norco) 7.5-325 mg tablet; Take 1 tablet by mouth every 6 hours if needed for severe pain (7 - 10). Do not start before August 25, 2023.  -     HYDROcodone-acetaminophen (Norco) 7.5-325 mg tablet; Take 1 tablet by mouth every 6 hours if needed for severe pain (7 - 10). Do not start before September 24, 2023.  Chronic inflammatory demyelinating polyneuropathy (CMS/HCC)  -     predniSONE 1 mg tablet,delayed release (DR/EC); Take 3 mg by mouth once daily.  -     HYDROcodone-acetaminophen (Norco) 7.5-325 mg tablet; Take 1 tablet by mouth every 6 hours if needed for severe pain (7 - 10). Do not start before July 26, 2023.  -     HYDROcodone-acetaminophen (Norco) 7.5-325 mg tablet; Take 1 tablet by mouth every 6 hours if needed for severe pain (7 - 10). Do not start before August 25, 2023.  -     HYDROcodone-acetaminophen (Norco) 7.5-325 mg tablet; Take 1 tablet by mouth every 6 hours if needed for severe pain (7 - 10). Do not start before September 24, 2023.  Mild persistent asthma, unspecified whether  complicated  Chronic obstructive pulmonary disease, unspecified COPD type (CMS/HCC)  -     BI mammo bilateral screening tomosynthesis; Future  -     CT lung screening low dose; Future  -     CBC and Auto Differential; Future  -     Comprehensive Metabolic Panel; Future  -     Lipid Panel; Future  -     TSH with reflex to Free T4 if abnormal; Future  Cigarette nicotine dependence without complication  Nicotine dependence, cigarettes, in remission  -     CT lung screening low dose; Future  Visit for screening mammogram  -     BI mammo bilateral screening tomosynthesis; Future

## 2023-07-17 ENCOUNTER — TELEPHONE (OUTPATIENT)
Dept: PRIMARY CARE | Facility: CLINIC | Age: 58
End: 2023-07-17
Payer: MEDICARE

## 2023-07-17 DIAGNOSIS — R07.81 RIB PAIN ON RIGHT SIDE: Primary | ICD-10-CM

## 2023-07-17 NOTE — TELEPHONE ENCOUNTER
Patient said, she mentioned this at last visit. Seems to be getting worse, Having right sided below rib pain. Comes and goes     She is looking to see what she should do next?

## 2023-07-24 PROBLEM — R07.81 RIB PAIN ON RIGHT SIDE: Status: ACTIVE | Noted: 2023-07-24

## 2023-07-25 NOTE — TELEPHONE ENCOUNTER
Patient wants to know if you think its safe for her to get another CT , says she has had a few in the last 3 months

## 2023-09-02 PROBLEM — E66.3 OVERWEIGHT WITH BODY MASS INDEX (BMI) OF 26 TO 26.9 IN ADULT: Status: ACTIVE | Noted: 2023-09-02

## 2023-09-02 PROBLEM — M79.2 RADICULAR PAIN IN RIGHT ARM: Status: ACTIVE | Noted: 2023-04-24

## 2023-09-02 RX ORDER — CYCLOBENZAPRINE HCL 10 MG
10 TABLET ORAL 3 TIMES DAILY PRN
COMMUNITY
End: 2023-10-13 | Stop reason: WASHOUT

## 2023-09-02 RX ORDER — IBUPROFEN 200 MG
200 TABLET ORAL EVERY 6 HOURS PRN
COMMUNITY

## 2023-09-02 RX ORDER — PREDNISOLONE 5 MG/1
5 TABLET ORAL DAILY
COMMUNITY
End: 2023-10-13 | Stop reason: WASHOUT

## 2023-09-02 RX ORDER — POTASSIUM CHLORIDE 750 MG/1
20 TABLET, FILM COATED, EXTENDED RELEASE ORAL DAILY
COMMUNITY
End: 2023-10-13 | Stop reason: WASHOUT

## 2023-09-02 RX ORDER — PREDNISONE 1 MG/1
4 TABLET ORAL
COMMUNITY
Start: 2022-05-05 | End: 2023-10-13 | Stop reason: ALTCHOICE

## 2023-09-02 RX ORDER — FLUTICASONE PROPIONATE 50 MCG
2 SPRAY, SUSPENSION (ML) NASAL DAILY
COMMUNITY
Start: 2021-12-20

## 2023-09-02 RX ORDER — ALBUTEROL SULFATE 90 UG/1
2 AEROSOL, METERED RESPIRATORY (INHALATION) EVERY 4 HOURS PRN
COMMUNITY
End: 2023-10-13 | Stop reason: WASHOUT

## 2023-09-02 RX ORDER — ALPRAZOLAM 0.5 MG/1
TABLET ORAL 2 TIMES DAILY PRN
COMMUNITY
End: 2023-10-13 | Stop reason: ALTCHOICE

## 2023-09-02 RX ORDER — FAMOTIDINE 20 MG/1
20 TABLET, FILM COATED ORAL 2 TIMES DAILY
COMMUNITY
End: 2023-10-13 | Stop reason: WASHOUT

## 2023-09-02 RX ORDER — DIPHENHYDRAMINE HCL 25 MG
25 CAPSULE ORAL EVERY 6 HOURS PRN
COMMUNITY

## 2023-09-02 RX ORDER — CLOTRIMAZOLE 10 MG/1
10 LOZENGE ORAL; TOPICAL 4 TIMES DAILY
COMMUNITY
End: 2023-10-13 | Stop reason: WASHOUT

## 2023-09-02 RX ORDER — ROSUVASTATIN CALCIUM 20 MG/1
20 TABLET, COATED ORAL
COMMUNITY
Start: 2022-03-14 | End: 2023-10-13 | Stop reason: WASHOUT

## 2023-09-02 RX ORDER — METOPROLOL SUCCINATE 25 MG/1
TABLET, EXTENDED RELEASE ORAL
COMMUNITY
End: 2023-10-13 | Stop reason: WASHOUT

## 2023-09-02 RX ORDER — HYDROCODONE BITARTRATE AND ACETAMINOPHEN 7.5; 325 MG/15ML; MG/15ML
SOLUTION ORAL 2 TIMES DAILY PRN
COMMUNITY
End: 2024-01-02 | Stop reason: WASHOUT

## 2023-09-02 RX ORDER — VALSARTAN AND HYDROCHLOROTHIAZIDE 320; 25 MG/1; MG/1
1 TABLET, FILM COATED ORAL DAILY
COMMUNITY
Start: 2023-02-13 | End: 2023-10-13 | Stop reason: WASHOUT

## 2023-09-02 RX ORDER — POLYETHYLENE GLYCOL 3350 17 G/17G
17 POWDER, FOR SOLUTION ORAL DAILY PRN
COMMUNITY
Start: 2015-06-08 | End: 2023-10-13 | Stop reason: WASHOUT

## 2023-09-02 RX ORDER — CHOLECALCIFEROL (VITAMIN D3) 50 MCG
TABLET ORAL DAILY
COMMUNITY

## 2023-09-02 RX ORDER — HYDROCODONE BITARTRATE AND ACETAMINOPHEN 7.5; 3 MG/1; MG/1
1 TABLET ORAL EVERY 6 HOURS PRN
COMMUNITY
End: 2024-01-02 | Stop reason: WASHOUT

## 2023-09-02 RX ORDER — ALBUTEROL SULFATE 0.63 MG/3ML
0.63 SOLUTION RESPIRATORY (INHALATION) SEE ADMIN INSTRUCTIONS
COMMUNITY
Start: 2013-08-09

## 2023-09-02 RX ORDER — ALBUTEROL SULFATE 0.83 MG/ML
3 SOLUTION RESPIRATORY (INHALATION) EVERY 6 HOURS PRN
COMMUNITY
End: 2023-10-13 | Stop reason: WASHOUT

## 2023-09-07 ENCOUNTER — HOSPITAL ENCOUNTER (OUTPATIENT)
Age: 58
Discharge: HOME | End: 2023-09-07
Payer: MEDICARE

## 2023-09-07 LAB
ALBUMIN SERPL-MCNC: 4.2 G/DL (ref 3.5–5.2)
ALP SERPL-CCNC: 71 U/L (ref 35–104)
ALT SERPL-CCNC: 7 U/L (ref 0–32)
ANION GAP SERPL CALCULATED.3IONS-SCNC: 8 MMOL/L (ref 7–16)
AST SERPL-CCNC: 18 U/L (ref 0–31)
BASOPHILS # BLD: 0.03 K/UL (ref 0–0.2)
BASOPHILS NFR BLD: 1 % (ref 0–2)
BILIRUB SERPL-MCNC: 1 MG/DL (ref 0–1.2)
BUN SERPL-MCNC: 9 MG/DL (ref 6–23)
CALCIUM SERPL-MCNC: 9.1 MG/DL (ref 8.6–10.2)
CHLORIDE SERPL-SCNC: 103 MMOL/L (ref 98–107)
CHOLEST SERPL-MCNC: 197 MG/DL
CO2 SERPL-SCNC: 29 MMOL/L (ref 22–29)
CREAT SERPL-MCNC: 0.5 MG/DL (ref 0.5–1)
EOSINOPHIL # BLD: 0.23 K/UL (ref 0.05–0.5)
EOSINOPHILS RELATIVE PERCENT: 4 % (ref 0–6)
ERYTHROCYTE [DISTWIDTH] IN BLOOD BY AUTOMATED COUNT: 11.9 % (ref 11.5–15)
GFR SERPL CREATININE-BSD FRML MDRD: >60 ML/MIN/1.73M2
GLUCOSE P FAST SERPL-MCNC: 87 MG/DL (ref 74–99)
HCT VFR BLD AUTO: 41.2 % (ref 34–48)
HDLC SERPL-MCNC: 51 MG/DL
HGB BLD-MCNC: 13.6 G/DL (ref 11.5–15.5)
IMM GRANULOCYTES # BLD AUTO: <0.03 K/UL (ref 0–0.58)
IMM GRANULOCYTES NFR BLD: 0 % (ref 0–5)
LDLC SERPL CALC-MCNC: 110 MG/DL
LYMPHOCYTES NFR BLD: 2.03 K/UL (ref 1.5–4)
LYMPHOCYTES RELATIVE PERCENT: 31 % (ref 20–42)
MCH RBC QN AUTO: 31.4 PG (ref 26–35)
MCHC RBC AUTO-ENTMCNC: 33 G/DL (ref 32–34.5)
MCV RBC AUTO: 95.2 FL (ref 80–99.9)
MONOCYTES NFR BLD: 0.43 K/UL (ref 0.1–0.95)
MONOCYTES NFR BLD: 7 % (ref 2–12)
NEUTROPHILS NFR BLD: 58 % (ref 43–80)
NEUTS SEG NFR BLD: 3.85 K/UL (ref 1.8–7.3)
PLATELET # BLD AUTO: 327 K/UL (ref 130–450)
PMV BLD AUTO: 9.3 FL (ref 7–12)
POTASSIUM SERPL-SCNC: 4.3 MMOL/L (ref 3.5–5)
PROT SERPL-MCNC: 6.8 G/DL (ref 6.4–8.3)
RBC # BLD AUTO: 4.33 M/UL (ref 3.5–5.5)
SODIUM SERPL-SCNC: 140 MMOL/L (ref 132–146)
T4 FREE SERPL-MCNC: 1.1 NG/DL (ref 0.9–1.7)
TRIGL SERPL-MCNC: 178 MG/DL
TSH SERPL DL<=0.05 MIU/L-ACNC: 1 UIU/ML (ref 0.76–16.11)
VLDLC SERPL CALC-MCNC: 36 MG/DL
WBC OTHER # BLD: 6.6 K/UL (ref 4.5–11.5)

## 2023-09-07 PROCEDURE — 84439 ASSAY OF FREE THYROXINE: CPT

## 2023-09-07 PROCEDURE — 84443 ASSAY THYROID STIM HORMONE: CPT

## 2023-09-07 PROCEDURE — 85025 COMPLETE CBC W/AUTO DIFF WBC: CPT

## 2023-09-07 PROCEDURE — 36415 COLL VENOUS BLD VENIPUNCTURE: CPT

## 2023-09-07 PROCEDURE — 80053 COMPREHEN METABOLIC PANEL: CPT

## 2023-09-07 PROCEDURE — 80061 LIPID PANEL: CPT

## 2023-10-05 ENCOUNTER — TELEPHONE (OUTPATIENT)
Dept: PRIMARY CARE | Facility: CLINIC | Age: 58
End: 2023-10-05
Payer: MEDICARE

## 2023-10-09 NOTE — PROGRESS NOTES
Date of Service: 10/10/2023  Patient: La Donnelly  MRN: [id number]      History of Present Illness:   Ms. Donnelly is a 58 y.o. female  whom was seen today for her scheduled Virtual follow up appointment with both Audio + Video regarding her chronic inflammatory demyelinating polyneuropathy (CIDP). She was previously seen 4/27/2023    Since her previous appointment, she continues to be doing very well with her symptoms on her current medication and SQIG regimen. Her weakness in her legs remain stable and denies any new weakness to her arms. She is able to walk without assistance and denies any falls.  Residual numbness to her feet continues. She is hoping to be able to reduce her prednisone dose further today which was reduced at her previous appointment from 5 mg to 4 mg.  She is hoping to reduced this further to 3 mg.       She continues to receive SQIG weekly through [facility] company. She tolerates her dose well and is independent with her infusions.     She wears an AFO to her left which does help with inversion of her ankle but has been noticing that her toes are beginning to turn under.      Otherwise, the past medical history, social history, and review of systems were reviewed. There are no significant changes.     Neuromuscular Exam:     The neurological examination was limited since this was a telemedicine visit.  The patient was alert with normal speech, cognition and fund of knowledge.     Results:    CBC:   Lab Results   Component Value Date    WBC 6.1 07/29/2023    HGB 13.9 07/29/2023    HCT 40.8 07/29/2023     07/29/2023     BMP:   Lab Results   Component Value Date     07/29/2023    K 4.2 07/29/2023     07/29/2023    CO2 31 07/29/2023    BUN 9 07/29/2023    CREATININE 0.46 (L) 07/29/2023    CALCIUM 9.1 07/29/2023     LFT:   Lab Results   Component Value Date    ALKPHOS 62 07/29/2023    BILITOT 0.6 07/29/2023    PROT 7.3 07/29/2023    ALBUMIN 4.5 07/29/2023     ALT 9 07/29/2023    AST 17 07/29/2023     Problem List Items Addressed This Visit       CIDP (chronic inflammatory demyelinating polyneuropathy) (CMS/[facility]) - Primary        Impression:  La Donnelly is a 58 y.o. with Enbrel (etanercept)-induced CIDP since 2007. She had responded very well to pulse IVIG with some fluctuation. Because of poor venous access, she was switched in the spring 2020 to subcutaneous immunoglobulin.     She is doing well since the switch from IVIG to subcutaneous Ig. She has had an increase in weakness in the legs in early 2022. Her neurological examination was unchanged without any definite detectable new weakness in the legs. She had easily obtained reflexes including at the knees but absent at the ankles. There was no evidence of cervical or thoracic myelopathy on MRI and her vitamin B12 and copper levels were normal. Her EMG study in May 2022 did not show evidence of active denervation or demyelination. There are finding of secondary axonal loss. This was slightly better than her last EMG done in 2015 although none has been done since.  She has improved since her prednisone was increased from 3 mg to 15 mg in May 2022.  She ultimately reduced her prednisone dose back to 4 mg daily.  She uses an AFO on the left foot because of chronic mild eversion deformity.      Plan:    1. Continue S/Q Ig 22 g weekly.  2. Reduce prednisone from 4 mg daily to 3 mg daily.   3. Continue vitamin D and calcium.     She reported in 6 months preferably in person.  She will call for any questions.    Hermilo Mosley M.D., F.A.C.P.   Director, Neuromuscular Center & EMG laboratory   [facility]   [facility]   Professor of Neurology   [facility]       The total appointment time today was 25 minutes. Time included preparing to see the patient, obtaining the history, counseling and educating the patient, and documenting  clinical information in the medical record     For information on Fall & Injury Prevention, visit [email or web address]

## 2023-10-10 ENCOUNTER — TELEMEDICINE (OUTPATIENT)
Dept: NEUROLOGY | Facility: HOSPITAL | Age: 58
End: 2023-10-10
Payer: MEDICARE

## 2023-10-10 DIAGNOSIS — G61.81 CIDP (CHRONIC INFLAMMATORY DEMYELINATING POLYNEUROPATHY) (MULTI): Primary | ICD-10-CM

## 2023-10-10 PROCEDURE — 99213 OFFICE O/P EST LOW 20 MIN: CPT | Performed by: PSYCHIATRY & NEUROLOGY

## 2023-10-10 PROCEDURE — 99213 OFFICE O/P EST LOW 20 MIN: CPT | Mod: 95 | Performed by: PSYCHIATRY & NEUROLOGY

## 2023-10-13 ENCOUNTER — OFFICE VISIT (OUTPATIENT)
Dept: PRIMARY CARE | Facility: CLINIC | Age: 58
End: 2023-10-13
Payer: MEDICARE

## 2023-10-13 VITALS
TEMPERATURE: 97 F | HEIGHT: 69 IN | DIASTOLIC BLOOD PRESSURE: 80 MMHG | BODY MASS INDEX: 23.85 KG/M2 | OXYGEN SATURATION: 98 % | SYSTOLIC BLOOD PRESSURE: 130 MMHG | HEART RATE: 77 BPM | WEIGHT: 161 LBS

## 2023-10-13 DIAGNOSIS — M79.671 BILATERAL FOOT PAIN: ICD-10-CM

## 2023-10-13 DIAGNOSIS — G61.81 CHRONIC INFLAMMATORY DEMYELINATING POLYNEUROPATHY (MULTI): ICD-10-CM

## 2023-10-13 DIAGNOSIS — E78.2 COMBINED HYPERLIPIDEMIA: Primary | ICD-10-CM

## 2023-10-13 DIAGNOSIS — M06.9 RHEUMATOID ARTHRITIS INVOLVING MULTIPLE SITES, UNSPECIFIED WHETHER RHEUMATOID FACTOR PRESENT (MULTI): ICD-10-CM

## 2023-10-13 DIAGNOSIS — I10 PRIMARY HYPERTENSION: ICD-10-CM

## 2023-10-13 DIAGNOSIS — R29.898 WEAKNESS OF BOTH LOWER EXTREMITIES: ICD-10-CM

## 2023-10-13 DIAGNOSIS — R93.89 ABNORMAL COMPUTERIZED AXIAL TOMOGRAPHY OF CHEST: ICD-10-CM

## 2023-10-13 DIAGNOSIS — G61.81 CIDP (CHRONIC INFLAMMATORY DEMYELINATING POLYNEUROPATHY) (MULTI): ICD-10-CM

## 2023-10-13 DIAGNOSIS — M25.50 PAIN IN UNSPECIFIED JOINT: ICD-10-CM

## 2023-10-13 DIAGNOSIS — F33.9 DEPRESSION, RECURRENT (CMS-HCC): ICD-10-CM

## 2023-10-13 DIAGNOSIS — M21.542 ACQUIRED EQUINOVARUS DEFORMITY OF LEFT FOOT: ICD-10-CM

## 2023-10-13 DIAGNOSIS — F17.210 CIGARETTE NICOTINE DEPENDENCE WITHOUT COMPLICATION: ICD-10-CM

## 2023-10-13 DIAGNOSIS — M21.541 ACQUIRED EQUINOVARUS DEFORMITY OF RIGHT FOOT: ICD-10-CM

## 2023-10-13 DIAGNOSIS — J30.1 SEASONAL ALLERGIC RHINITIS DUE TO POLLEN: ICD-10-CM

## 2023-10-13 DIAGNOSIS — M79.672 BILATERAL FOOT PAIN: ICD-10-CM

## 2023-10-13 DIAGNOSIS — M25.50 ARTHRALGIA, UNSPECIFIED JOINT: ICD-10-CM

## 2023-10-13 PROCEDURE — 3079F DIAST BP 80-89 MM HG: CPT | Performed by: FAMILY MEDICINE

## 2023-10-13 PROCEDURE — 4004F PT TOBACCO SCREEN RCVD TLK: CPT | Performed by: FAMILY MEDICINE

## 2023-10-13 PROCEDURE — 3075F SYST BP GE 130 - 139MM HG: CPT | Performed by: FAMILY MEDICINE

## 2023-10-13 PROCEDURE — 99214 OFFICE O/P EST MOD 30 MIN: CPT | Performed by: FAMILY MEDICINE

## 2023-10-13 RX ORDER — LOSARTAN POTASSIUM AND HYDROCHLOROTHIAZIDE 25; 100 MG/1; MG/1
1 TABLET ORAL DAILY
Qty: 90 TABLET | Refills: 3 | Status: SHIPPED | OUTPATIENT
Start: 2023-10-13

## 2023-10-13 RX ORDER — HYDROCODONE BITARTRATE AND ACETAMINOPHEN 7.5; 325 MG/1; MG/1
1 TABLET ORAL EVERY 6 HOURS PRN
Qty: 120 TABLET | Refills: 0 | Status: SHIPPED | OUTPATIENT
Start: 2023-10-24 | End: 2023-11-23

## 2023-10-13 RX ORDER — HYDROCODONE BITARTRATE AND ACETAMINOPHEN 7.5; 325 MG/1; MG/1
1 TABLET ORAL EVERY 6 HOURS PRN
Qty: 120 TABLET | Refills: 0 | Status: SHIPPED | OUTPATIENT
Start: 2023-11-22 | End: 2023-12-22

## 2023-10-13 RX ORDER — HYDROCODONE BITARTRATE AND ACETAMINOPHEN 7.5; 325 MG/1; MG/1
1 TABLET ORAL EVERY 6 HOURS PRN
Qty: 120 TABLET | Refills: 0 | Status: SHIPPED | OUTPATIENT
Start: 2023-12-23 | End: 2024-01-02 | Stop reason: SDUPTHER

## 2023-10-13 NOTE — PATIENT INSTRUCTIONS
Diagnoses and all orders for this visit:  Combined hyperlipidemia  Pain in unspecified joint  -     losartan-hydrochlorothiazide (Hyzaar) 100-25 mg tablet; Take 1 tablet by mouth once daily.  Bilateral foot pain  -     HYDROcodone-acetaminophen (Norco) 7.5-325 mg tablet; Take 1 tablet by mouth every 6 hours if needed for severe pain (7 - 10). Do not start before October 24, 2023.  -     HYDROcodone-acetaminophen (Norco) 7.5-325 mg tablet; Take 1 tablet by mouth every 6 hours if needed for severe pain (7 - 10). Do not start before November 22, 2023.  -     HYDROcodone-acetaminophen (Norco) 7.5-325 mg tablet; Take 1 tablet by mouth every 6 hours if needed for severe pain (7 - 10). Do not start before December 23, 2023.  Arthralgia, unspecified joint  -     HYDROcodone-acetaminophen (Norco) 7.5-325 mg tablet; Take 1 tablet by mouth every 6 hours if needed for severe pain (7 - 10). Do not start before October 24, 2023.  -     HYDROcodone-acetaminophen (Norco) 7.5-325 mg tablet; Take 1 tablet by mouth every 6 hours if needed for severe pain (7 - 10). Do not start before November 22, 2023.  -     HYDROcodone-acetaminophen (Norco) 7.5-325 mg tablet; Take 1 tablet by mouth every 6 hours if needed for severe pain (7 - 10). Do not start before December 23, 2023.  Weakness of both lower extremities  -     HYDROcodone-acetaminophen (Norco) 7.5-325 mg tablet; Take 1 tablet by mouth every 6 hours if needed for severe pain (7 - 10). Do not start before October 24, 2023.  -     HYDROcodone-acetaminophen (Norco) 7.5-325 mg tablet; Take 1 tablet by mouth every 6 hours if needed for severe pain (7 - 10). Do not start before November 22, 2023.  -     HYDROcodone-acetaminophen (Norco) 7.5-325 mg tablet; Take 1 tablet by mouth every 6 hours if needed for severe pain (7 - 10). Do not start before December 23, 2023.  Chronic inflammatory demyelinating polyneuropathy (CMS/HCC)  -     HYDROcodone-acetaminophen (Norco) 7.5-325 mg tablet; Take  1 tablet by mouth every 6 hours if needed for severe pain (7 - 10). Do not start before October 24, 2023.  -     HYDROcodone-acetaminophen (Norco) 7.5-325 mg tablet; Take 1 tablet by mouth every 6 hours if needed for severe pain (7 - 10). Do not start before November 22, 2023.  -     HYDROcodone-acetaminophen (Norco) 7.5-325 mg tablet; Take 1 tablet by mouth every 6 hours if needed for severe pain (7 - 10). Do not start before December 23, 2023.  Primary hypertension  Seasonal allergic rhinitis due to pollen  Depression, recurrent (CMS/HCC)  Rheumatoid arthritis involving multiple sites, unspecified whether rheumatoid factor present (CMS/HCC)  Acquired equinovarus deformity of left foot  Acquired equinovarus deformity of right foot  CIDP (chronic inflammatory demyelinating polyneuropathy) (CMS/[facility])  Cigarette nicotine dependence without complication  Abnormal computerized axial tomography of chest           Latest Reference Range & Units Most Recent 10/14/21 - 10/13/23   SODIUM 136 - 145 mmol/L 139  7/29/23 12:09   POTASSIUM 3.5 - 5.3 mmol/L 4.2  7/29/23 12:09   CHLORIDE 98 - 107 mmol/L 103  7/29/23 12:09   Bicarbonate 21 - 32 mmol/L 31  7/29/23 12:09   Blood Urea Nitrogen 6 - 23 mg/dL 9  7/29/23 12:09   Creatinine 0.50 - 1.05 mg/dL 0.46 (L)  7/29/23 12:09   GLUCOSE 74 - 99 mg/dL 83  7/29/23 12:09   Calcium 8.6 - 10.3 mg/dL 9.1  7/29/23 12:09   Alkaline Phosphatase 33 - 110 U/L 62  7/29/23 12:09   Albumin 3.4 - 5.0 g/dL 4.5  7/29/23 12:09   Total Protein 6.4 - 8.2 g/dL 7.3  7/29/23 12:09   AST 9 - 39 U/L 17  7/29/23 12:09   ALT 7 - 45 U/L 9 [1]  7/29/23 12:09   Bilirubin Total 0.0 - 1.2 mg/dL 0.6  7/29/23 12:09   WBC 4.4 - 11.3 x10E9/L 6.1  7/29/23 12:09   RBC 4.00 - 5.20 x10E12/L 4.42  7/29/23 12:09   HEMOGLOBIN 12.0 - 16.0 g/dL 13.9  7/29/23 12:09   HEMATOCRIT 36.0 - 46.0 % 40.8  7/29/23 12:09   MCV 80 - 100 fL 92  7/29/23 12:09   MCHC 32.0 - 36.0 g/dL 34.1  7/29/23 12:09   RED CELL DISTRIBUTION WIDTH 11.5 - 14.5  % 11.9  7/29/23 12:09   Platelets 150 - 450 x10E9/L 304  7/29/23 12:09   Neutrophils Absolute 1.20 - 7.70 x10E9/L 4.03  7/29/23 12:09   Neutrophils % 40.0 - 80.0 % 65.9  7/29/23 12:09   Lymphocytes Absolute 1.20 - 4.80 x10E9/L 1.59  7/29/23 12:09   Lymphocytes % 13.0 - 44.0 % 26.1  7/29/23 12:09   Monocytes Absolute 0.10 - 1.00 x10E9/L 0.34  7/29/23 12:09   Monocytes % 2.0 - 10.0 % 5.6  7/29/23 12:09   Eosinophils Absolute 0.00 - 0.70 x10E9/L 0.09  7/29/23 12:09   Eosinophils % 0.0 - 6.0 % 1.5  7/29/23 12:09   Basophils Absolute 0.00 - 0.10 x10E9/L 0.04  7/29/23 12:09   Basophils % 0.0 - 2.0 % 0.7  7/29/23 12:09   (L): Data is abnormally low  [1]  Patients treated with Sulfasalazine may generate    falsely decreased results for ALT.

## 2023-10-13 NOTE — PROGRESS NOTES
Subjective   Patient ID: La Donnelly is a 58 y.o. female.    HPI  3 month followup. Contonues to wear AFO to help her anjkle stay upright. Since last visit she had abnormal ultrasoudn which required an MRI which is normal. Symptoms have since resolved.   OARRS:  Laura Ward MD on 10/13/2023 11:44 AM  I have personally reviewed the OARRS report for La Donnelly. I have considered the risks of abuse, dependence, addiction and diversion and I believe that it is clinically appropriate for La Donnelly to be prescribed this medication    Is the patient prescribed a combination of a benzodiazepine and opioid?  No    Last Urine Drug Screen / ordered today: Yes  Recent Results (from the past 8760 hour(s))   OPIATE/OPIOID/BENZO PRESCRIPTION COMPLIANCE    Collection Time: 02/01/23 10:47 AM   Result Value Ref Range    DRUG SCREEN COMMENT URINE SEE BELOW     Creatine, Urine 26.1 mg/dL    Amphetamine Screen, Urine PRESUMPTIVE NEGATIVE NEGATIVE    Barbiturate Screen, Urine PRESUMPTIVE NEGATIVE NEGATIVE    Cannabinoid Screen, Urine PRESUMPTIVE NEGATIVE NEGATIVE    Cocaine Screen, Urine PRESUMPTIVE NEGATIVE NEGATIVE    PCP Screen, Urine PRESUMPTIVE NEGATIVE NEGATIVE    7-Aminoclonazepam <25 Cutoff <25 ng/mL    Alpha-Hydroxyalprazolam <25 Cutoff <25 ng/mL    Alpha-Hydroxymidazolam <25 Cutoff <25 ng/mL    Alprazolam <25 Cutoff <25 ng/mL    Chlordiazepoxide <25 Cutoff <25 ng/mL    Clonazepam <25 Cutoff <25 ng/mL    Diazepam <25 Cutoff <25 ng/mL    Lorazepam <25 Cutoff <25 ng/mL    Midazolam <25 Cutoff <25 ng/mL    Nordiazepam <25 Cutoff <25 ng/mL    Oxazepam <25 Cutoff <25 ng/mL    Temazepam <25 Cutoff <25 ng/mL    Zolpidem <25 Cutoff <25 ng/mL    Zolpidem Metabolite (ZCA) <25 Cutoff <25 ng/mL    6-Acetylmorphine <25 Cutoff <25 ng/mL    Codeine <50 Cutoff <50 ng/mL    Hydrocodone 904 (A) Cutoff <25 ng/mL    Hydromorphone 663 (A) Cutoff <25 ng/mL    Morphine Urine <50 Cutoff <50 ng/mL    Norhydrocodone 950 (A) Cutoff <25  ng/mL    Noroxycodone <25 Cutoff <25 ng/mL    Oxycodone <25 Cutoff <25 ng/mL    Oxymorphone <25 Cutoff <25 ng/mL    Tramadol <50 Cutoff <50 ng/mL    O-Desmethyltramadol <50 Cutoff <50 ng/mL    Fentanyl <2.5 Cutoff<2.5 ng/mL    Norfentanyl <2.5 Cutoff<2.5 ng/mL    METHADONE CONFIRMATION,URINE <25 Cutoff <25 ng/mL    EDDP <25 Cutoff <25 ng/mL     Results are as expected.         Controlled Substance Agreement:  Date of the Last Agreement: 02/2023  Reviewed Controlled Substance Agreement including but not limited to the benefits, risks, and alternatives to treatment with a Controlled Substance medication(s).    Opioids:  What is the patient's goal of therapy? Improved pain  Is this being achieved with current treatment? yes    I have calculated the patient's Morphine Dose Equivalent (MED):   I have considered referral to Pain Management and/or a specialist, and do not feel it is necessary at this time.    I feel that it is clinically indicated to continue this current medication regimen after consideration of alternative therapies, and other non-opioid treatment.    Opioid Risk Screening:  No data recorded    Pain Assessment:  6/10  Review of Systems    Objective   Physical Exam  Vitals reviewed.   Constitutional:       Appearance: Normal appearance.   HENT:      Head: Normocephalic and atraumatic.      Right Ear: Tympanic membrane normal.      Left Ear: Tympanic membrane normal.      Nose: Nose normal.      Mouth/Throat:      Mouth: Mucous membranes are moist.      Pharynx: Oropharynx is clear.   Eyes:      Extraocular Movements: Extraocular movements intact.      Conjunctiva/sclera: Conjunctivae normal.      Pupils: Pupils are equal, round, and reactive to light.   Cardiovascular:      Rate and Rhythm: Normal rate and regular rhythm.      Pulses: Normal pulses.      Heart sounds: Normal heart sounds.   Pulmonary:      Effort: Pulmonary effort is normal.      Breath sounds: Normal breath sounds.   Abdominal:       General: Abdomen is flat. Bowel sounds are normal.      Palpations: Abdomen is soft.   Musculoskeletal:         General: Normal range of motion.      Cervical back: Normal range of motion and neck supple.   Skin:     General: Skin is warm and dry.      Capillary Refill: Capillary refill takes less than 2 seconds.   Neurological:      General: No focal deficit present.      Mental Status: She is alert and oriented to person, place, and time.   Psychiatric:         Mood and Affect: Mood normal.         Behavior: Behavior normal.         Assessment/Plan   Diagnoses and all orders for this visit:  Combined hyperlipidemia  Pain in unspecified joint  -     losartan-hydrochlorothiazide (Hyzaar) 100-25 mg tablet; Take 1 tablet by mouth once daily.  Bilateral foot pain  -     HYDROcodone-acetaminophen (Norco) 7.5-325 mg tablet; Take 1 tablet by mouth every 6 hours if needed for severe pain (7 - 10). Do not start before October 24, 2023.  -     HYDROcodone-acetaminophen (Norco) 7.5-325 mg tablet; Take 1 tablet by mouth every 6 hours if needed for severe pain (7 - 10). Do not start before November 22, 2023.  -     HYDROcodone-acetaminophen (Norco) 7.5-325 mg tablet; Take 1 tablet by mouth every 6 hours if needed for severe pain (7 - 10). Do not start before December 23, 2023.  Arthralgia, unspecified joint  -     HYDROcodone-acetaminophen (Norco) 7.5-325 mg tablet; Take 1 tablet by mouth every 6 hours if needed for severe pain (7 - 10). Do not start before October 24, 2023.  -     HYDROcodone-acetaminophen (Norco) 7.5-325 mg tablet; Take 1 tablet by mouth every 6 hours if needed for severe pain (7 - 10). Do not start before November 22, 2023.  -     HYDROcodone-acetaminophen (Norco) 7.5-325 mg tablet; Take 1 tablet by mouth every 6 hours if needed for severe pain (7 - 10). Do not start before December 23, 2023.  Weakness of both lower extremities  -     HYDROcodone-acetaminophen (Norco) 7.5-325 mg tablet; Take 1 tablet by  mouth every 6 hours if needed for severe pain (7 - 10). Do not start before October 24, 2023.  -     HYDROcodone-acetaminophen (Norco) 7.5-325 mg tablet; Take 1 tablet by mouth every 6 hours if needed for severe pain (7 - 10). Do not start before November 22, 2023.  -     HYDROcodone-acetaminophen (Norco) 7.5-325 mg tablet; Take 1 tablet by mouth every 6 hours if needed for severe pain (7 - 10). Do not start before December 23, 2023.  Chronic inflammatory demyelinating polyneuropathy (CMS/HCC)  -     HYDROcodone-acetaminophen (Norco) 7.5-325 mg tablet; Take 1 tablet by mouth every 6 hours if needed for severe pain (7 - 10). Do not start before October 24, 2023.  -     HYDROcodone-acetaminophen (Norco) 7.5-325 mg tablet; Take 1 tablet by mouth every 6 hours if needed for severe pain (7 - 10). Do not start before November 22, 2023.  -     HYDROcodone-acetaminophen (Norco) 7.5-325 mg tablet; Take 1 tablet by mouth every 6 hours if needed for severe pain (7 - 10). Do not start before December 23, 2023.  Primary hypertension  Seasonal allergic rhinitis due to pollen  Depression, recurrent (CMS/HCC)  Rheumatoid arthritis involving multiple sites, unspecified whether rheumatoid factor present (CMS/HCC)  Acquired equinovarus deformity of left foot  Acquired equinovarus deformity of right foot  CIDP (chronic inflammatory demyelinating polyneuropathy) (CMS/HCC)  Cigarette nicotine dependence without complication  Abnormal computerized axial tomography of chest

## 2024-01-02 ENCOUNTER — OFFICE VISIT (OUTPATIENT)
Dept: PRIMARY CARE | Facility: CLINIC | Age: 59
End: 2024-01-02
Payer: MEDICARE

## 2024-01-02 VITALS
DIASTOLIC BLOOD PRESSURE: 80 MMHG | HEIGHT: 69 IN | RESPIRATION RATE: 16 BRPM | WEIGHT: 165 LBS | BODY MASS INDEX: 24.44 KG/M2 | SYSTOLIC BLOOD PRESSURE: 136 MMHG | HEART RATE: 73 BPM | OXYGEN SATURATION: 98 %

## 2024-01-02 DIAGNOSIS — F11.20 UNCOMPLICATED OPIOID DEPENDENCE (MULTI): ICD-10-CM

## 2024-01-02 DIAGNOSIS — F33.9 DEPRESSION, RECURRENT (CMS-HCC): ICD-10-CM

## 2024-01-02 DIAGNOSIS — M79.671 BILATERAL FOOT PAIN: ICD-10-CM

## 2024-01-02 DIAGNOSIS — M79.672 BILATERAL FOOT PAIN: ICD-10-CM

## 2024-01-02 DIAGNOSIS — Z00.00 ROUTINE GENERAL MEDICAL EXAMINATION AT HEALTH CARE FACILITY: ICD-10-CM

## 2024-01-02 DIAGNOSIS — Z79.899 MEDICATION MANAGEMENT: ICD-10-CM

## 2024-01-02 DIAGNOSIS — M25.50 ARTHRALGIA, UNSPECIFIED JOINT: ICD-10-CM

## 2024-01-02 DIAGNOSIS — J44.9 CHRONIC OBSTRUCTIVE PULMONARY DISEASE, UNSPECIFIED COPD TYPE (MULTI): ICD-10-CM

## 2024-01-02 DIAGNOSIS — G82.20 PARAPARESIS (MULTI): Primary | ICD-10-CM

## 2024-01-02 DIAGNOSIS — G61.81 CHRONIC INFLAMMATORY DEMYELINATING POLYNEUROPATHY (MULTI): ICD-10-CM

## 2024-01-02 DIAGNOSIS — R29.898 WEAKNESS OF BOTH LOWER EXTREMITIES: ICD-10-CM

## 2024-01-02 DIAGNOSIS — M06.9 RHEUMATOID ARTHRITIS INVOLVING MULTIPLE SITES, UNSPECIFIED WHETHER RHEUMATOID FACTOR PRESENT (MULTI): ICD-10-CM

## 2024-01-02 PROCEDURE — 80358 DRUG SCREENING METHADONE: CPT

## 2024-01-02 PROCEDURE — 80361 OPIATES 1 OR MORE: CPT

## 2024-01-02 PROCEDURE — 80373 DRUG SCREENING TRAMADOL: CPT

## 2024-01-02 PROCEDURE — 3075F SYST BP GE 130 - 139MM HG: CPT | Performed by: FAMILY MEDICINE

## 2024-01-02 PROCEDURE — 99214 OFFICE O/P EST MOD 30 MIN: CPT | Performed by: FAMILY MEDICINE

## 2024-01-02 PROCEDURE — 80346 BENZODIAZEPINES1-12: CPT

## 2024-01-02 PROCEDURE — 80365 DRUG SCREENING OXYCODONE: CPT

## 2024-01-02 PROCEDURE — G0439 PPPS, SUBSEQ VISIT: HCPCS | Performed by: FAMILY MEDICINE

## 2024-01-02 PROCEDURE — 80354 DRUG SCREENING FENTANYL: CPT

## 2024-01-02 PROCEDURE — 3079F DIAST BP 80-89 MM HG: CPT | Performed by: FAMILY MEDICINE

## 2024-01-02 PROCEDURE — 80368 SEDATIVE HYPNOTICS: CPT

## 2024-01-02 RX ORDER — HYDROCODONE BITARTRATE AND ACETAMINOPHEN 7.5; 325 MG/1; MG/1
1 TABLET ORAL EVERY 6 HOURS PRN
Qty: 120 TABLET | Refills: 0 | Status: SHIPPED | OUTPATIENT
Start: 2024-01-22 | End: 2024-02-21

## 2024-01-02 RX ORDER — HYDROCODONE BITARTRATE AND ACETAMINOPHEN 7.5; 325 MG/1; MG/1
1 TABLET ORAL EVERY 6 HOURS PRN
Qty: 120 TABLET | Refills: 0 | Status: SHIPPED | OUTPATIENT
Start: 2024-03-22 | End: 2024-04-02 | Stop reason: SDUPTHER

## 2024-01-02 RX ORDER — HYDROCODONE BITARTRATE AND ACETAMINOPHEN 7.5; 325 MG/1; MG/1
1 TABLET ORAL EVERY 6 HOURS PRN
Qty: 120 TABLET | Refills: 0 | Status: SHIPPED | OUTPATIENT
Start: 2024-02-21 | End: 2024-03-22

## 2024-01-02 ASSESSMENT — PATIENT HEALTH QUESTIONNAIRE - PHQ9
SUM OF ALL RESPONSES TO PHQ9 QUESTIONS 1 AND 2: 0
2. FEELING DOWN, DEPRESSED OR HOPELESS: NOT AT ALL
1. LITTLE INTEREST OR PLEASURE IN DOING THINGS: NOT AT ALL
SUM OF ALL RESPONSES TO PHQ9 QUESTIONS 1 AND 2: 0
1. LITTLE INTEREST OR PLEASURE IN DOING THINGS: NOT AT ALL
2. FEELING DOWN, DEPRESSED OR HOPELESS: NOT AT ALL

## 2024-01-02 ASSESSMENT — ENCOUNTER SYMPTOMS
COUGH: 1
DEPRESSION: 0
LOSS OF SENSATION IN FEET: 0
BACK PAIN: 1
OCCASIONAL FEELINGS OF UNSTEADINESS: 0
ARTHRALGIAS: 1

## 2024-01-02 ASSESSMENT — ACTIVITIES OF DAILY LIVING (ADL)
MANAGING_FINANCES: INDEPENDENT
BATHING: INDEPENDENT
DRESSING: INDEPENDENT
GROCERY_SHOPPING: INDEPENDENT
DOING_HOUSEWORK: INDEPENDENT
TAKING_MEDICATION: INDEPENDENT

## 2024-01-02 ASSESSMENT — COLUMBIA-SUICIDE SEVERITY RATING SCALE - C-SSRS
2. HAVE YOU ACTUALLY HAD ANY THOUGHTS OF KILLING YOURSELF?: NO
1. IN THE PAST MONTH, HAVE YOU WISHED YOU WERE DEAD OR WISHED YOU COULD GO TO SLEEP AND NOT WAKE UP?: NO
6. HAVE YOU EVER DONE ANYTHING, STARTED TO DO ANYTHING, OR PREPARED TO DO ANYTHING TO END YOUR LIFE?: NO

## 2024-01-02 NOTE — PATIENT INSTRUCTIONS
Plan to order labwrok after next visit.     Try and get mammogram done., Discuss CT with pulmonology.

## 2024-01-02 NOTE — PROGRESS NOTES
Subjective   Reason for Visit: La Donnelly is an 58 y.o. female here for a Medicare Wellness visit.   Also for refill on three month medications, OARRS:  Laura Ward MD on 1/2/2024  9:28 AM  I have personally reviewed the OARRS report for La Donnelly. I have considered the risks of abuse, dependence, addiction and diversion and I believe that it is clinically appropriate for La Donnelly to be prescribed this medication    Is the patient prescribed a combination of a benzodiazepine and opioid?  Yes, I feel it is clincially indicated to continue the medication and have discussed with the patient risks/benefits/alternatives.    Last Urine Drug Screen / ordered today: Yes  Recent Results (from the past 8760 hour(s))   OPIATE/OPIOID/BENZO PRESCRIPTION COMPLIANCE    Collection Time: 02/01/23 10:47 AM   Result Value Ref Range    DRUG SCREEN COMMENT URINE SEE BELOW     Creatine, Urine 26.1 mg/dL    Amphetamine Screen, Urine PRESUMPTIVE NEGATIVE NEGATIVE    Barbiturate Screen, Urine PRESUMPTIVE NEGATIVE NEGATIVE    Cannabinoid Screen, Urine PRESUMPTIVE NEGATIVE NEGATIVE    Cocaine Screen, Urine PRESUMPTIVE NEGATIVE NEGATIVE    PCP Screen, Urine PRESUMPTIVE NEGATIVE NEGATIVE    7-Aminoclonazepam <25 Cutoff <25 ng/mL    Alpha-Hydroxyalprazolam <25 Cutoff <25 ng/mL    Alpha-Hydroxymidazolam <25 Cutoff <25 ng/mL    Alprazolam <25 Cutoff <25 ng/mL    Chlordiazepoxide <25 Cutoff <25 ng/mL    Clonazepam <25 Cutoff <25 ng/mL    Diazepam <25 Cutoff <25 ng/mL    Lorazepam <25 Cutoff <25 ng/mL    Midazolam <25 Cutoff <25 ng/mL    Nordiazepam <25 Cutoff <25 ng/mL    Oxazepam <25 Cutoff <25 ng/mL    Temazepam <25 Cutoff <25 ng/mL    Zolpidem <25 Cutoff <25 ng/mL    Zolpidem Metabolite (ZCA) <25 Cutoff <25 ng/mL    6-Acetylmorphine <25 Cutoff <25 ng/mL    Codeine <50 Cutoff <50 ng/mL    Hydrocodone 904 (A) Cutoff <25 ng/mL    Hydromorphone 663 (A) Cutoff <25 ng/mL    Morphine Urine <50 Cutoff <50 ng/mL    Norhydrocodone  "950 (A) Cutoff <25 ng/mL    Noroxycodone <25 Cutoff <25 ng/mL    Oxycodone <25 Cutoff <25 ng/mL    Oxymorphone <25 Cutoff <25 ng/mL    Tramadol <50 Cutoff <50 ng/mL    O-Desmethyltramadol <50 Cutoff <50 ng/mL    Fentanyl <2.5 Cutoff<2.5 ng/mL    Norfentanyl <2.5 Cutoff<2.5 ng/mL    METHADONE CONFIRMATION,URINE <25 Cutoff <25 ng/mL    EDDP <25 Cutoff <25 ng/mL     Results are as expected.         Controlled Substance Agreement:  Date of the Last Agreement: 2/2023  Reviewed Controlled Substance Agreement including but not limited to the benefits, risks, and alternatives to treatment with a Controlled Substance medication(s).    Opioids:  What is the patient's goal of therapy? Improved pain  Is this being achieved with current treatment? yes    I have calculated the patient's Morphine Dose Equivalent (MED):   I have considered referral to Pain Management and/or a specialist, and do not feel it is necessary at this time.    I feel that it is clinically indicated to continue this current medication regimen after consideration of alternative therapies, and other non-opioid treatment.    Opioid Risk Screening:  No data recorded    Pain Assessment:  3-4 daily  Past Medical, Surgical, and Family History reviewed and updated in chart.    Reviewed all medications by prescribing practitioner or clinical pharmacist (such as prescriptions, OTCs, herbal therapies and supplements) and documented in the medical record.    HPI    Patient Care Team:  Laura Ward MD as PCP - General (Family Medicine)     Review of Systems   HENT:  Positive for postnasal drip.    Respiratory:  Positive for cough.    Musculoskeletal:  Positive for arthralgias, back pain and gait problem.   All other systems reviewed and are negative.      Objective   Vitals:  /80   Pulse 73   Resp 16   Ht 1.74 m (5' 8.5\")   Wt 74.8 kg (165 lb)   SpO2 98%   BMI 24.72 kg/m²     Labwork done in July. Mammogram is due , and she will schedule shortly. "   Physical Exam  Vitals reviewed.   Constitutional:       Appearance: Normal appearance.   HENT:      Head: Normocephalic and atraumatic.      Right Ear: Tympanic membrane normal.      Left Ear: Tympanic membrane normal.      Nose: Nose normal.      Mouth/Throat:      Mouth: Mucous membranes are moist.      Pharynx: Oropharynx is clear.   Eyes:      Extraocular Movements: Extraocular movements intact.      Conjunctiva/sclera: Conjunctivae normal.      Pupils: Pupils are equal, round, and reactive to light.   Cardiovascular:      Rate and Rhythm: Normal rate and regular rhythm.      Pulses: Normal pulses.      Heart sounds: Normal heart sounds.   Pulmonary:      Effort: Pulmonary effort is normal.      Breath sounds: Normal breath sounds.   Abdominal:      General: Abdomen is flat. Bowel sounds are normal.      Palpations: Abdomen is soft.   Musculoskeletal:         General: Normal range of motion.      Cervical back: Normal range of motion and neck supple.   Skin:     General: Skin is warm and dry.      Capillary Refill: Capillary refill takes less than 2 seconds.   Neurological:      General: No focal deficit present.      Mental Status: She is alert and oriented to person, place, and time. Mental status is at baseline.      Comments: Left AFO.    Psychiatric:         Mood and Affect: Mood normal.         Behavior: Behavior normal.         Assessment/Plan   Problem List Items Addressed This Visit       Bilateral foot pain    Relevant Medications    HYDROcodone-acetaminophen (Norco) 7.5-325 mg tablet (Start on 1/22/2024)    HYDROcodone-acetaminophen (Norco) 7.5-325 mg tablet (Start on 2/21/2024)    HYDROcodone-acetaminophen (Norco) 7.5-325 mg tablet (Start on 3/22/2024)    Chronic inflammatory demyelinating polyneuropathy (CMS/HCC)    Relevant Medications    HYDROcodone-acetaminophen (Norco) 7.5-325 mg tablet (Start on 1/22/2024)    Depression, recurrent (CMS/HCC)    Rheumatoid arthritis (CMS/HCC)    Uncomplicated  opioid dependence (CMS/[facility])    Chronic obstructive lung disease (CMS/[facility])    Leg weakness    Relevant Medications    HYDROcodone-acetaminophen (Norco) 7.5-325 mg tablet (Start on 1/22/2024)    HYDROcodone-acetaminophen (Norco) 7.5-325 mg tablet (Start on 2/21/2024)    HYDROcodone-acetaminophen (Norco) 7.5-325 mg tablet (Start on 3/22/2024)    Paraparesis (CMS/[facility]) - Primary    Arthralgia    Relevant Medications    HYDROcodone-acetaminophen (Norco) 7.5-325 mg tablet (Start on 1/22/2024)    HYDROcodone-acetaminophen (Norco) 7.5-325 mg tablet (Start on 2/21/2024)    HYDROcodone-acetaminophen (Norco) 7.5-325 mg tablet (Start on 3/22/2024)     Other Visit Diagnoses       Medication management        Relevant Orders    Opiate/Opioid/Benzo Prescription Compliance    Routine general medical examination at health care facility

## 2024-01-05 LAB
1OH-MIDAZOLAM UR CFM-MCNC: <25 NG/ML
6MAM UR CFM-MCNC: <25 NG/ML
7AMINOCLONAZEPAM UR CFM-MCNC: <25 NG/ML
A-OH ALPRAZ UR CFM-MCNC: <25 NG/ML
ALPRAZ UR CFM-MCNC: <25 NG/ML
CHLORDIAZEP UR CFM-MCNC: <25 NG/ML
CLONAZEPAM UR CFM-MCNC: <25 NG/ML
CODEINE UR CFM-MCNC: <50 NG/ML
DIAZEPAM UR CFM-MCNC: <25 NG/ML
EDDP UR CFM-MCNC: <25 NG/ML
FENTANYL UR CFM-MCNC: <2.5 NG/ML
HYDROCODONE CTO UR CFM-MCNC: 519 NG/ML
HYDROMORPHONE UR CFM-MCNC: 373 NG/ML
LORAZEPAM UR CFM-MCNC: <25 NG/ML
METHADONE UR CFM-MCNC: <25 NG/ML
MIDAZOLAM UR CFM-MCNC: <25 NG/ML
MORPHINE UR CFM-MCNC: <50 NG/ML
NORDIAZEPAM UR CFM-MCNC: <25 NG/ML
NORFENTANYL UR CFM-MCNC: <2.5 NG/ML
NORHYDROCODONE UR CFM-MCNC: 574 NG/ML
NOROXYCODONE UR CFM-MCNC: <25 NG/ML
NORTRAMADOL UR-MCNC: <50 NG/ML
OXAZEPAM UR CFM-MCNC: <25 NG/ML
OXYCODONE UR CFM-MCNC: <25 NG/ML
OXYMORPHONE UR CFM-MCNC: <25 NG/ML
TEMAZEPAM UR CFM-MCNC: <25 NG/ML
TRAMADOL UR CFM-MCNC: <50 NG/ML
ZOLPIDEM UR CFM-MCNC: <25 NG/ML
ZOLPIDEM UR-MCNC: <25 NG/ML

## 2024-02-01 ENCOUNTER — TELEPHONE (OUTPATIENT)
Dept: NEUROLOGY | Facility: HOSPITAL | Age: 59
End: 2024-02-01
Payer: MEDICARE

## 2024-02-01 NOTE — TELEPHONE ENCOUNTER
Pt called and needs an new order for IVIG Subcutaneous per [facility] [phone].  Pt is scheduled for next week.

## 2024-04-02 ENCOUNTER — OFFICE VISIT (OUTPATIENT)
Dept: PRIMARY CARE | Facility: CLINIC | Age: 59
End: 2024-04-02
Payer: MEDICARE

## 2024-04-02 VITALS
WEIGHT: 171.4 LBS | OXYGEN SATURATION: 95 % | HEART RATE: 77 BPM | DIASTOLIC BLOOD PRESSURE: 80 MMHG | RESPIRATION RATE: 16 BRPM | HEIGHT: 69 IN | SYSTOLIC BLOOD PRESSURE: 134 MMHG | BODY MASS INDEX: 25.39 KG/M2

## 2024-04-02 DIAGNOSIS — F33.9 DEPRESSION, RECURRENT (CMS-HCC): ICD-10-CM

## 2024-04-02 DIAGNOSIS — R29.898 WEAKNESS OF BOTH LOWER EXTREMITIES: ICD-10-CM

## 2024-04-02 DIAGNOSIS — M06.9 RHEUMATOID ARTHRITIS INVOLVING MULTIPLE SITES, UNSPECIFIED WHETHER RHEUMATOID FACTOR PRESENT (MULTI): ICD-10-CM

## 2024-04-02 DIAGNOSIS — M54.2 CERVICALGIA: ICD-10-CM

## 2024-04-02 DIAGNOSIS — F11.20 UNCOMPLICATED OPIOID DEPENDENCE (MULTI): ICD-10-CM

## 2024-04-02 DIAGNOSIS — M79.671 BILATERAL FOOT PAIN: ICD-10-CM

## 2024-04-02 DIAGNOSIS — G89.29 OTHER CHRONIC PAIN: Chronic | ICD-10-CM

## 2024-04-02 DIAGNOSIS — M79.672 BILATERAL FOOT PAIN: ICD-10-CM

## 2024-04-02 DIAGNOSIS — F41.9 ANXIETY: ICD-10-CM

## 2024-04-02 DIAGNOSIS — M25.50 ARTHRALGIA, UNSPECIFIED JOINT: ICD-10-CM

## 2024-04-02 DIAGNOSIS — M21.542 ACQUIRED EQUINOVARUS DEFORMITY OF LEFT FOOT: Primary | ICD-10-CM

## 2024-04-02 DIAGNOSIS — M21.541 ACQUIRED EQUINOVARUS DEFORMITY OF RIGHT FOOT: ICD-10-CM

## 2024-04-02 DIAGNOSIS — G61.81 CHRONIC INFLAMMATORY DEMYELINATING POLYNEUROPATHY (MULTI): ICD-10-CM

## 2024-04-02 DIAGNOSIS — F51.01 PRIMARY INSOMNIA: ICD-10-CM

## 2024-04-02 DIAGNOSIS — J44.9 CHRONIC OBSTRUCTIVE PULMONARY DISEASE, UNSPECIFIED COPD TYPE (MULTI): ICD-10-CM

## 2024-04-02 DIAGNOSIS — E78.2 COMBINED HYPERLIPIDEMIA: ICD-10-CM

## 2024-04-02 PROBLEM — J44.1 CHRONIC OBSTRUCTIVE PULMONARY DISEASE WITH ACUTE EXACERBATION (MULTI): Status: ACTIVE | Noted: 2024-04-02

## 2024-04-02 PROCEDURE — 3079F DIAST BP 80-89 MM HG: CPT | Performed by: FAMILY MEDICINE

## 2024-04-02 PROCEDURE — 99214 OFFICE O/P EST MOD 30 MIN: CPT | Performed by: FAMILY MEDICINE

## 2024-04-02 PROCEDURE — 3075F SYST BP GE 130 - 139MM HG: CPT | Performed by: FAMILY MEDICINE

## 2024-04-02 RX ORDER — CYCLOBENZAPRINE HCL 10 MG
10 TABLET ORAL 2 TIMES DAILY PRN
Qty: 180 TABLET | Refills: 1 | Status: SHIPPED | OUTPATIENT
Start: 2024-04-02

## 2024-04-02 RX ORDER — HYDROCODONE BITARTRATE AND ACETAMINOPHEN 7.5; 325 MG/1; MG/1
1 TABLET ORAL EVERY 6 HOURS PRN
Qty: 120 TABLET | Refills: 0 | Status: SHIPPED | OUTPATIENT
Start: 2024-06-20 | End: 2024-07-20

## 2024-04-02 RX ORDER — HYDROCODONE BITARTRATE AND ACETAMINOPHEN 7.5; 325 MG/1; MG/1
1 TABLET ORAL EVERY 6 HOURS PRN
Qty: 120 TABLET | Refills: 0 | Status: SHIPPED | OUTPATIENT
Start: 2024-05-21 | End: 2024-06-20

## 2024-04-02 RX ORDER — HYDROCODONE BITARTRATE AND ACETAMINOPHEN 7.5; 325 MG/1; MG/1
1 TABLET ORAL EVERY 6 HOURS PRN
Qty: 120 TABLET | Refills: 0 | Status: SHIPPED | OUTPATIENT
Start: 2024-04-21 | End: 2024-05-21

## 2024-04-02 RX ORDER — CYCLOBENZAPRINE HCL 10 MG
10 TABLET ORAL 2 TIMES DAILY PRN
Qty: 180 TABLET | Refills: 1 | Status: SHIPPED | OUTPATIENT
Start: 2024-04-02 | End: 2024-04-02 | Stop reason: SDUPTHER

## 2024-04-02 ASSESSMENT — PATIENT HEALTH QUESTIONNAIRE - PHQ9
SUM OF ALL RESPONSES TO PHQ9 QUESTIONS 1 AND 2: 0
2. FEELING DOWN, DEPRESSED OR HOPELESS: NOT AT ALL
1. LITTLE INTEREST OR PLEASURE IN DOING THINGS: NOT AT ALL

## 2024-04-02 ASSESSMENT — ENCOUNTER SYMPTOMS
OCCASIONAL FEELINGS OF UNSTEADINESS: 0
ARTHRALGIAS: 1
LOSS OF SENSATION IN FEET: 0
DEPRESSION: 0

## 2024-04-02 ASSESSMENT — COLUMBIA-SUICIDE SEVERITY RATING SCALE - C-SSRS
6. HAVE YOU EVER DONE ANYTHING, STARTED TO DO ANYTHING, OR PREPARED TO DO ANYTHING TO END YOUR LIFE?: NO
2. HAVE YOU ACTUALLY HAD ANY THOUGHTS OF KILLING YOURSELF?: NO
1. IN THE PAST MONTH, HAVE YOU WISHED YOU WERE DEAD OR WISHED YOU COULD GO TO SLEEP AND NOT WAKE UP?: NO

## 2024-04-02 NOTE — PROGRESS NOTES
Subjective   Patient ID: La Donnelly is a 58 y.o. female.    HPI  3 month follow up. Pain level 7/10. Meds reconciled.Needs rx for new AFO. She requires AFO due to deformity of both feet causing foot drop, due to chronic inflammatory demyelinating polyneuropathy.   Review of Systems   Musculoskeletal:  Positive for arthralgias.   All other systems reviewed and are negative.  OARRS:  Laura Ward MD on 4/2/2024 10:05 AM  I have personally reviewed the OARRS report for La Donnelly. I have considered the risks of abuse, dependence, addiction and diversion and I believe that it is clinically appropriate for La Donnelly to be prescribed this medication  Is just finished with oral antibiotic from pulmonology.   Is the patient prescribed a combination of a benzodiazepine and opioid? No    Last Urine Drug Screen / ordered today: Yes  Recent Results (from the past 8760 hour(s))   Confirmation Opiate/Opioid/Benzo Prescription Compliance    Collection Time: 01/02/24  9:58 AM   Result Value Ref Range    Clonazepam <25 <25 ng/mL    7-Aminoclonazepam <25 <25 ng/mL    Alprazolam <25 <25 ng/mL    Alpha-Hydroxyalprazolam <25 <25 ng/mL    Midazolam <25 <25 ng/mL    Alpha-Hydroxymidazolam <25 <25 ng/mL    Chlordiazepoxide <25 <25 ng/mL    Diazepam <25 <25 ng/mL    Nordiazepam <25 <25 ng/mL    Temazepam <25 <25 ng/mL    Oxazepam <25 <25 ng/mL    Lorazepam <25 <25 ng/mL    Methadone <25 <25 ng/mL    EDDP <25 <25 ng/mL    6-Acetylmorphine <25 <25 ng/mL    Codeine <50 <50 ng/mL    Hydrocodone 519 (H) <25 ng/mL    Hydromorphone 373 (H) <25 ng/mL    Morphine  <50 <50 ng/mL    Norhydrocodone 574 (H) <25 ng/mL    Noroxycodone <25 <25 ng/mL    Oxycodone <25 <25 ng/mL    Oxymorphone <25 <25 ng/mL    Fentanyl <2.5 <2.5 ng/mL    Norfentanyl <2.5 <2.5 ng/mL    Tramadol <50 <50 ng/mL    O-Desmethyltramadol <50 <50 ng/mL    Zolpidem <25 <25 ng/mL    Zolpidem Metabolite (ZCA) <25 <25 ng/mL     Results are as expected.          Controlled Substance Agreement:  Date of the Last Agreement: 1/2023  Reviewed Controlled Substance Agreement including but not limited to the benefits, risks, and alternatives to treatment with a Controlled Substance medication(s).    Opioids:  What is the patient's goal of therapy? Improved pain.  Is this being achieved with current treatment? yes    I have calculated the patient's Morphine Dose Equivalent (MED):   I have considered referral to Pain Management and/or a specialist, and do not feel it is necessary at this time.    I feel that it is clinically indicated to continue this current medication regimen after consideration of alternative therapies, and other non-opioid treatment.    Opioid Risk Screening:  No data recorded    Pain Assessment:  7/10    Objective   Physical Exam  Vitals reviewed.   Constitutional:       Appearance: Normal appearance.   HENT:      Head: Normocephalic and atraumatic.      Right Ear: Tympanic membrane normal.      Left Ear: Tympanic membrane normal.      Nose: Nose normal.      Mouth/Throat:      Mouth: Mucous membranes are moist.      Pharynx: Oropharynx is clear.   Eyes:      Extraocular Movements: Extraocular movements intact.      Conjunctiva/sclera: Conjunctivae normal.      Pupils: Pupils are equal, round, and reactive to light.   Cardiovascular:      Rate and Rhythm: Normal rate and regular rhythm.      Pulses: Normal pulses.      Heart sounds: Normal heart sounds.   Pulmonary:      Effort: Pulmonary effort is normal.      Breath sounds: Normal breath sounds.   Abdominal:      General: Abdomen is flat. Bowel sounds are normal.      Palpations: Abdomen is soft.   Musculoskeletal:         General: Normal range of motion.      Cervical back: Normal range of motion and neck supple.   Skin:     General: Skin is warm and dry.      Capillary Refill: Capillary refill takes less than 2 seconds.   Neurological:      General: No focal deficit present.      Mental Status: She  is alert and oriented to person, place, and time.   Psychiatric:         Mood and Affect: Mood normal.         Behavior: Behavior normal.         Assessment/Plan   Diagnoses and all orders for this visit:  Acquired equinovarus deformity of left foot  -     General supply request patient is in need of NEW AFO bilaterally, causing pressure sore  Chronic inflammatory demyelinating polyneuropathy (Multi)  -     Follow Up In [facility] - PCP - Established  -     General supply request patient is in need of NEW AFO bilaterally, causing pressure sore  -     Follow Up In [facility] - PCP - Established; Future  Arthralgia, unspecified joint  -     Follow Up In [facility] - PCP - Established  -     HYDROcodone-acetaminophen (Norco) 7.5-325 mg tablet; Take 1 tablet by mouth every 6 hours if needed for severe pain (7 - 10). Do not start before April 21, 2024.  -     HYDROcodone-acetaminophen (Norco) 7.5-325 mg tablet; Take 1 tablet by mouth every 6 hours if needed for severe pain (7 - 10). Do not start before May 21, 2024.  -     HYDROcodone-acetaminophen (Norco) 7.5-325 mg tablet; Take 1 tablet by mouth every 6 hours if needed for severe pain (7 - 10). Do not start before June 20, 2024.  Acquired equinovarus deformity of right foot  -     General supply request patient is in need of NEW AFO bilaterally, causing pressure sore  Combined hyperlipidemia  Anxiety  Depression, recurrent (CMS-HCC)  Uncomplicated opioid dependence (Multi)  Rheumatoid arthritis involving multiple sites, unspecified whether rheumatoid factor present (Multi)  Other chronic pain  -     HYDROcodone-acetaminophen (Norco) 7.5-325 mg tablet; Take 1 tablet by mouth every 6 hours if needed for severe pain (7 - 10). Do not start before April 21, 2024.  -     HYDROcodone-acetaminophen (Norco) 7.5-325 mg tablet; Take 1 tablet by mouth every 6 hours if needed for severe pain (7 - 10). Do not start before May 21, 2024.  -      HYDROcodone-acetaminophen (Norco) 7.5-325 mg tablet; Take 1 tablet by mouth every 6 hours if needed for severe pain (7 - 10). Do not start before June 20, 2024.  Chronic obstructive pulmonary disease, unspecified COPD type (Multi)  Primary insomnia  Bilateral foot pain  -     HYDROcodone-acetaminophen (Norco) 7.5-325 mg tablet; Take 1 tablet by mouth every 6 hours if needed for severe pain (7 - 10). Do not start before April 21, 2024.  -     HYDROcodone-acetaminophen (Norco) 7.5-325 mg tablet; Take 1 tablet by mouth every 6 hours if needed for severe pain (7 - 10). Do not start before May 21, 2024.  -     HYDROcodone-acetaminophen (Norco) 7.5-325 mg tablet; Take 1 tablet by mouth every 6 hours if needed for severe pain (7 - 10). Do not start before June 20, 2024.  Weakness of both lower extremities  -     HYDROcodone-acetaminophen (Norco) 7.5-325 mg tablet; Take 1 tablet by mouth every 6 hours if needed for severe pain (7 - 10). Do not start before April 21, 2024.  -     HYDROcodone-acetaminophen (Norco) 7.5-325 mg tablet; Take 1 tablet by mouth every 6 hours if needed for severe pain (7 - 10). Do not start before May 21, 2024.  -     HYDROcodone-acetaminophen (Norco) 7.5-325 mg tablet; Take 1 tablet by mouth every 6 hours if needed for severe pain (7 - 10). Do not start before June 20, 2024.  Cervicalgia  -     cyclobenzaprine (Flexeril) 10 mg tablet; Take 1 tablet (10 mg) by mouth 2 times a day as needed for muscle spasms.  Try adding magnesium citrate 250 mg at bedtime.

## 2024-04-02 NOTE — PATIENT INSTRUCTIONS
Assessment/Plan   Diagnoses and all orders for this visit:  Acquired equinovarus deformity of left foot  -     General supply request patient is in need of NEW AFO bilaterally, causing pressure sore  Chronic inflammatory demyelinating polyneuropathy (CMS/HCC)  -     Follow Up In [facility] - PCP - Established  -     General supply request patient is in need of NEW AFO bilaterally, causing pressure sore  Arthralgia, unspecified joint  -     Follow Up In [facility] - PCP - Established  -     HYDROcodone-acetaminophen (Norco) 7.5-325 mg tablet; Take 1 tablet by mouth every 6 hours if needed for severe pain (7 - 10). Do not start before April 21, 2024.  -     HYDROcodone-acetaminophen (Norco) 7.5-325 mg tablet; Take 1 tablet by mouth every 6 hours if needed for severe pain (7 - 10). Do not start before May 21, 2024.  -     HYDROcodone-acetaminophen (Norco) 7.5-325 mg tablet; Take 1 tablet by mouth every 6 hours if needed for severe pain (7 - 10). Do not start before June 20, 2024.  Acquired equinovarus deformity of right foot  -     General supply request patient is in need of NEW AFO bilaterally, causing pressure sore  Combined hyperlipidemia  Anxiety  Depression, recurrent (CMS/HCC)  Uncomplicated opioid dependence (CMS/HCC)  Rheumatoid arthritis involving multiple sites, unspecified whether rheumatoid factor present (CMS/HCC)  Other chronic pain  -     HYDROcodone-acetaminophen (Norco) 7.5-325 mg tablet; Take 1 tablet by mouth every 6 hours if needed for severe pain (7 - 10). Do not start before April 21, 2024.  -     HYDROcodone-acetaminophen (Norco) 7.5-325 mg tablet; Take 1 tablet by mouth every 6 hours if needed for severe pain (7 - 10). Do not start before May 21, 2024.  -     HYDROcodone-acetaminophen (Norco) 7.5-325 mg tablet; Take 1 tablet by mouth every 6 hours if needed for severe pain (7 - 10). Do not start before June 20, 2024.  Chronic obstructive pulmonary disease, unspecified  COPD type (CMS/HCC)  Primary insomnia  Bilateral foot pain  -     HYDROcodone-acetaminophen (Norco) 7.5-325 mg tablet; Take 1 tablet by mouth every 6 hours if needed for severe pain (7 - 10). Do not start before April 21, 2024.  -     HYDROcodone-acetaminophen (Norco) 7.5-325 mg tablet; Take 1 tablet by mouth every 6 hours if needed for severe pain (7 - 10). Do not start before May 21, 2024.  -     HYDROcodone-acetaminophen (Norco) 7.5-325 mg tablet; Take 1 tablet by mouth every 6 hours if needed for severe pain (7 - 10). Do not start before June 20, 2024.  Weakness of both lower extremities  -     HYDROcodone-acetaminophen (Norco) 7.5-325 mg tablet; Take 1 tablet by mouth every 6 hours if needed for severe pain (7 - 10). Do not start before April 21, 2024.  -     HYDROcodone-acetaminophen (Norco) 7.5-325 mg tablet; Take 1 tablet by mouth every 6 hours if needed for severe pain (7 - 10). Do not start before May 21, 2024.  -     HYDROcodone-acetaminophen (Norco) 7.5-325 mg tablet; Take 1 tablet by mouth every 6 hours if needed for severe pain (7 - 10). Do not start before June 20, 2024.  Cervicalgia  -     cyclobenzaprine (Flexeril) 10 mg tablet; Take 1 tablet (10 mg) by mouth 2 times a day as needed for muscle spasms.  Try adding magnesium citrate 250 mg at bedtime.

## 2024-05-30 ENCOUNTER — OFFICE VISIT (OUTPATIENT)
Dept: NEUROLOGY | Facility: CLINIC | Age: 59
End: 2024-05-30
Payer: MEDICARE

## 2024-05-30 VITALS
HEIGHT: 69 IN | SYSTOLIC BLOOD PRESSURE: 156 MMHG | WEIGHT: 165 LBS | DIASTOLIC BLOOD PRESSURE: 81 MMHG | HEART RATE: 80 BPM | RESPIRATION RATE: 18 BRPM | BODY MASS INDEX: 24.44 KG/M2

## 2024-05-30 DIAGNOSIS — Z79.899 LONG-TERM CURRENT USE OF INTRAVENOUS IMMUNOGLOBULIN (IVIG): ICD-10-CM

## 2024-05-30 DIAGNOSIS — G61.81 CHRONIC INFLAMMATORY DEMYELINATING POLYNEUROPATHY (MULTI): Primary | ICD-10-CM

## 2024-05-30 PROCEDURE — 99214 OFFICE O/P EST MOD 30 MIN: CPT | Performed by: PSYCHIATRY & NEUROLOGY

## 2024-05-30 PROCEDURE — 99214 OFFICE O/P EST MOD 30 MIN: CPT | Mod: GC | Performed by: PSYCHIATRY & NEUROLOGY

## 2024-05-30 PROCEDURE — 3077F SYST BP >= 140 MM HG: CPT | Performed by: PSYCHIATRY & NEUROLOGY

## 2024-05-30 PROCEDURE — 3079F DIAST BP 80-89 MM HG: CPT | Performed by: PSYCHIATRY & NEUROLOGY

## 2024-05-30 RX ORDER — CALCIUM CARBONATE/VITAMIN D3 500-10/5ML
400 LIQUID (ML) ORAL
COMMUNITY

## 2024-05-30 ASSESSMENT — PATIENT HEALTH QUESTIONNAIRE - PHQ9
SUM OF ALL RESPONSES TO PHQ9 QUESTIONS 1 AND 2: 0
1. LITTLE INTEREST OR PLEASURE IN DOING THINGS: NOT AT ALL
2. FEELING DOWN, DEPRESSED OR HOPELESS: NOT AT ALL

## 2024-05-30 ASSESSMENT — ENCOUNTER SYMPTOMS
DEPRESSION: 0
OCCASIONAL FEELINGS OF UNSTEADINESS: 0
LOSS OF SENSATION IN FEET: 0

## 2024-05-30 ASSESSMENT — COLUMBIA-SUICIDE SEVERITY RATING SCALE - C-SSRS
1. IN THE PAST MONTH, HAVE YOU WISHED YOU WERE DEAD OR WISHED YOU COULD GO TO SLEEP AND NOT WAKE UP?: NO
6. HAVE YOU EVER DONE ANYTHING, STARTED TO DO ANYTHING, OR PREPARED TO DO ANYTHING TO END YOUR LIFE?: NO
2. HAVE YOU ACTUALLY HAD ANY THOUGHTS OF KILLING YOURSELF?: NO

## 2024-05-30 ASSESSMENT — PAIN SCALES - GENERAL: PAINLEVEL: 3

## 2024-05-30 NOTE — PROGRESS NOTES
Date of Service: 5/30/2024  Patient: La Donnelly  MRN: [id number]      History of Present Illness:   Ms. La Donnelly is a 58 year old woman  whom was seen today for her scheduled follow up appointment regarding her chronic inflammatory demyelinating polyneuropathy (CIDP). She was previously seen virtually 10/10/2023 and arrives alone.    Since her previous appointment, she has been having more noticeable issues with feet L>R.  She is waiting on receiving her new AFO's  since she has been unable to wear her previous as they would rub on the sides of her feet and ankles and cause discomfort.  Since then, her ankle is rotating more causing her to walk on the lateral aspect of her left foot.  Over the past couple months, she has also developed a heaviness to her legs by the end of the day.  Pain to her feet bilaterally have increased and she has noticed an intermittent cold sensation from her toes to mid calf's.  Toes continue to curl at night and will use the frame of her bed to stabilize and straighten.  She denies any falls but walking has been a little more difficult.  Numbness remains the same and denies any new weakness. She denies any issue with her arms and hands.        She continues to receive SQIG weekly through [facility] home infusion company. She tolerates her dose well and is independent with her infusions. She continues on her low dose of prednisone 3 mg daily.    She has had difficulty sleeping at night and recently started to take magnesium, hoping this will improve.    To recap, she developed in 2007 numbness and pain in both legs followed by significant weakness after she received 2 doses of intravenous Enbrel (etanercept) for rheumatoid arthritis.  She became wheelchair-bound and had severe neuropathy.  She was admitted to [facility], had severe weakness and areflexia and following an EMG which showed significant demyelinating neuropathy, she was diagnosed  "with chronic inflammatory demyelinating polyneuropathy.  She was started on IVIG and corticosteroids.  She improved significantly but continues to have some residual weakness in both legs.  Her prednisone was ultimately tapered and she continued on pulse IVIG.  She had an increase in weakness in the legs in early 2022. Her neurological examination was unchanged. Her EMG study in May 2022 did not show evidence of active denervation or demyelination. There are finding of secondary axonal loss. This was slightly better than her last EMG done in 2015 although none has been done since.  She has improved since her prednisone was increased from 3 mg to 15 mg in May 2022.  This was slowly reduced back to 3 mg daily.    Otherwise, the past medical history, social history, and review of systems were reviewed. There are no significant changes.     /81   Pulse 80   Resp 18   Ht 1.74 m (5' 8.5\")   Wt 74.8 kg (165 lb)   BMI 24.72 kg/m²     Neuromuscular Exam:     The motor examination is normal in the upper extremities. In the lower extremities, she has mild atrophy of the intrinsic foot muscles bilaterally.  She has a slight inversion deformity at rest of the left foot.  On formal testing, she has mild weakness of ankle dorsiflexion and toes extension bilaterally slightly worse on the left [4+ on the right and 4 on the left]. Plantarflexion is normal bilaterally. Her reflexes are +2 throughout except for absent ankle jerks. On sensory testing, she has absent vibration senses at the toes and reduced vibration senses at the ankles and normal elsewhere. She has slightly reduced position sense at the toes but normal elsewhere. There is decreased pin prick sensation up to midfoot bilaterally.   Her gait is normal but she inverted her left foot slightly when she walks.  She has some difficulty with tandem gait. Romberg test is negative. She is able to squat but has some difficulty getting up without using her hands.    "   Results:  Problem List Items Addressed This Visit          Neurologic Problems    Chronic inflammatory demyelinating polyneuropathy (Multi) - Primary       Other    Long-term current use of intravenous immunoglobulin (IVIG)    Current Assessment & Plan     Subcutaneous immunoglobulin            Impression:  La Donnelly is a 58 y.o. with Enbrel (etanercept)-induced CIDP since 2007. She had responded very well to pulse IVIG with some fluctuation. Because of poor venous access, she was switched in the spring 2020 to subcutaneous immunoglobulin.     She is doing well since the switch from IVIG to subcutaneous Ig. S She ultimately reduced her prednisone dose back to 3 mg daily.  She used an AFO on the left foot in the past because of chronic mild inversion deformity but has not used this for several years.  She is in the process of getting fitted for bilateral AFOs.     Plan:    1. Continue S/Q Ig 22 g weekly.  2. Reduce prednisone from 3 mg daily to 2 mg daily.   3. Continue vitamin D and calcium.   4. Agree with getting bilateral AFO and recommended to use the left  at all time to improve inversion contraction    She reported in 6 months.  She will call for any questions.      Hermilo Mosley M.D., F.A.C.P.   Director, [facility] & EMG laboratory   [facility]   [facility]   Professor of Neurology   [employer]    The total appointment time today was 30 minutes. Time included preparing to see the patient, obtaining the history, performing a medically necessary appropriate physical examination, counseling and educating the patient/family, ordering tests, referring and communicating with other providers, independently interpreting results  to the patient/family and documenting clinical information in the medical record.

## 2024-06-20 DIAGNOSIS — M79.671 BILATERAL FOOT PAIN: ICD-10-CM

## 2024-06-20 DIAGNOSIS — R29.898 WEAKNESS OF BOTH LOWER EXTREMITIES: ICD-10-CM

## 2024-06-20 DIAGNOSIS — M25.50 ARTHRALGIA, UNSPECIFIED JOINT: ICD-10-CM

## 2024-06-20 DIAGNOSIS — M79.672 BILATERAL FOOT PAIN: ICD-10-CM

## 2024-06-20 DIAGNOSIS — G89.29 OTHER CHRONIC PAIN: Chronic | ICD-10-CM

## 2024-06-20 RX ORDER — HYDROCODONE BITARTRATE AND ACETAMINOPHEN 7.5; 325 MG/1; MG/1
1 TABLET ORAL EVERY 6 HOURS PRN
Qty: 120 TABLET | Refills: 0 | Status: SHIPPED | OUTPATIENT
Start: 2024-06-20 | End: 2024-07-20

## 2024-06-28 ENCOUNTER — HOSPITAL ENCOUNTER (EMERGENCY)
Facility: HOSPITAL | Age: 59
Discharge: HOME | End: 2024-06-28
Attending: EMERGENCY MEDICINE
Payer: MEDICARE

## 2024-06-28 ENCOUNTER — APPOINTMENT (OUTPATIENT)
Dept: RADIOLOGY | Facility: HOSPITAL | Age: 59
End: 2024-06-28
Payer: MEDICARE

## 2024-06-28 VITALS
WEIGHT: 165 LBS | RESPIRATION RATE: 20 BRPM | HEART RATE: 74 BPM | OXYGEN SATURATION: 98 % | SYSTOLIC BLOOD PRESSURE: 130 MMHG | BODY MASS INDEX: 25.01 KG/M2 | DIASTOLIC BLOOD PRESSURE: 77 MMHG | TEMPERATURE: 98.4 F | HEIGHT: 68 IN

## 2024-06-28 DIAGNOSIS — M25.571 ACUTE BILATERAL ANKLE PAIN: Primary | ICD-10-CM

## 2024-06-28 DIAGNOSIS — M25.572 ACUTE BILATERAL ANKLE PAIN: Primary | ICD-10-CM

## 2024-06-28 LAB
ALBUMIN SERPL BCP-MCNC: 4.3 G/DL (ref 3.4–5)
ALP SERPL-CCNC: 64 U/L (ref 33–136)
ALT SERPL W P-5'-P-CCNC: 11 U/L (ref 7–45)
ANION GAP SERPL CALC-SCNC: 11 MMOL/L (ref 10–20)
AST SERPL W P-5'-P-CCNC: 21 U/L (ref 9–39)
BASOPHILS # BLD AUTO: 0.06 X10*3/UL (ref 0–0.1)
BASOPHILS NFR BLD AUTO: 0.8 %
BILIRUB DIRECT SERPL-MCNC: 0.1 MG/DL (ref 0–0.3)
BILIRUB SERPL-MCNC: 0.8 MG/DL (ref 0–1.2)
BUN SERPL-MCNC: 10 MG/DL (ref 6–23)
CALCIUM SERPL-MCNC: 9 MG/DL (ref 8.6–10.3)
CHLORIDE SERPL-SCNC: 97 MMOL/L (ref 98–107)
CO2 SERPL-SCNC: 27 MMOL/L (ref 21–32)
CREAT SERPL-MCNC: 0.49 MG/DL (ref 0.5–1.05)
CRP SERPL-MCNC: 0.17 MG/DL
EGFRCR SERPLBLD CKD-EPI 2021: >90 ML/MIN/1.73M*2
EOSINOPHIL # BLD AUTO: 0.24 X10*3/UL (ref 0–0.7)
EOSINOPHIL NFR BLD AUTO: 3.2 %
ERYTHROCYTE [DISTWIDTH] IN BLOOD BY AUTOMATED COUNT: 11.8 % (ref 11.5–14.5)
ERYTHROCYTE [SEDIMENTATION RATE] IN BLOOD BY WESTERGREN METHOD: 36 MM/H (ref 0–30)
GLUCOSE SERPL-MCNC: 97 MG/DL (ref 74–99)
HCT VFR BLD AUTO: 43.3 % (ref 36–46)
HGB BLD-MCNC: 15 G/DL (ref 12–16)
IMM GRANULOCYTES # BLD AUTO: 0.02 X10*3/UL (ref 0–0.7)
IMM GRANULOCYTES NFR BLD AUTO: 0.3 % (ref 0–0.9)
LYMPHOCYTES # BLD AUTO: 2.28 X10*3/UL (ref 1.2–4.8)
LYMPHOCYTES NFR BLD AUTO: 30.3 %
MCH RBC QN AUTO: 31.4 PG (ref 26–34)
MCHC RBC AUTO-ENTMCNC: 34.6 G/DL (ref 32–36)
MCV RBC AUTO: 91 FL (ref 80–100)
MONOCYTES # BLD AUTO: 0.43 X10*3/UL (ref 0.1–1)
MONOCYTES NFR BLD AUTO: 5.7 %
NEUTROPHILS # BLD AUTO: 4.49 X10*3/UL (ref 1.2–7.7)
NEUTROPHILS NFR BLD AUTO: 59.7 %
NRBC BLD-RTO: 0 /100 WBCS (ref 0–0)
PLATELET # BLD AUTO: 288 X10*3/UL (ref 150–450)
POTASSIUM SERPL-SCNC: 4.1 MMOL/L (ref 3.5–5.3)
PROT SERPL-MCNC: 7.8 G/DL (ref 6.4–8.2)
RBC # BLD AUTO: 4.78 X10*6/UL (ref 4–5.2)
SODIUM SERPL-SCNC: 131 MMOL/L (ref 136–145)
URATE SERPL-MCNC: 2.5 MG/DL (ref 2.3–6.7)
WBC # BLD AUTO: 7.5 X10*3/UL (ref 4.4–11.3)

## 2024-06-28 PROCEDURE — 73610 X-RAY EXAM OF ANKLE: CPT | Mod: RIGHT SIDE | Performed by: RADIOLOGY

## 2024-06-28 PROCEDURE — 82248 BILIRUBIN DIRECT: CPT | Performed by: EMERGENCY MEDICINE

## 2024-06-28 PROCEDURE — 85025 COMPLETE CBC W/AUTO DIFF WBC: CPT | Performed by: EMERGENCY MEDICINE

## 2024-06-28 PROCEDURE — 84550 ASSAY OF BLOOD/URIC ACID: CPT | Performed by: EMERGENCY MEDICINE

## 2024-06-28 PROCEDURE — 99284 EMERGENCY DEPT VISIT MOD MDM: CPT | Performed by: EMERGENCY MEDICINE

## 2024-06-28 PROCEDURE — 36415 COLL VENOUS BLD VENIPUNCTURE: CPT | Performed by: EMERGENCY MEDICINE

## 2024-06-28 PROCEDURE — 73610 X-RAY EXAM OF ANKLE: CPT | Mod: RT

## 2024-06-28 PROCEDURE — 86140 C-REACTIVE PROTEIN: CPT | Performed by: EMERGENCY MEDICINE

## 2024-06-28 PROCEDURE — 73610 X-RAY EXAM OF ANKLE: CPT | Mod: LEFT SIDE | Performed by: RADIOLOGY

## 2024-06-28 PROCEDURE — 80053 COMPREHEN METABOLIC PANEL: CPT | Performed by: EMERGENCY MEDICINE

## 2024-06-28 PROCEDURE — 85652 RBC SED RATE AUTOMATED: CPT | Performed by: EMERGENCY MEDICINE

## 2024-06-28 PROCEDURE — 73610 X-RAY EXAM OF ANKLE: CPT | Mod: LT

## 2024-06-28 ASSESSMENT — LIFESTYLE VARIABLES
EVER HAD A DRINK FIRST THING IN THE MORNING TO STEADY YOUR NERVES TO GET RID OF A HANGOVER: NO
TOTAL SCORE: 0
HAVE PEOPLE ANNOYED YOU BY CRITICIZING YOUR DRINKING: NO
EVER FELT BAD OR GUILTY ABOUT YOUR DRINKING: NO
HAVE YOU EVER FELT YOU SHOULD CUT DOWN ON YOUR DRINKING: NO

## 2024-06-28 ASSESSMENT — PAIN DESCRIPTION - LOCATION: LOCATION: ANKLE

## 2024-06-28 ASSESSMENT — PAIN DESCRIPTION - DESCRIPTORS: DESCRIPTORS: ACHING

## 2024-06-28 ASSESSMENT — PAIN SCALES - GENERAL: PAINLEVEL_OUTOF10: 10 - WORST POSSIBLE PAIN

## 2024-06-28 ASSESSMENT — PAIN DESCRIPTION - PAIN TYPE: TYPE: ACUTE PAIN

## 2024-06-28 ASSESSMENT — PAIN - FUNCTIONAL ASSESSMENT: PAIN_FUNCTIONAL_ASSESSMENT: 0-10

## 2024-06-28 NOTE — ED PROVIDER NOTES
HPI   Chief Complaint   Patient presents with    bilateral foot / ankle pain       HPI: []  58-year-old female with a history of CIDP, colon cancer, osteoporosis comes in with bilateral ankle pain and knee pain for the last few days atraumatic.  Patient is on subcutaneous Ig.  Patient denies any trauma falls fever chills nausea vomit diarrhea cough congestion.  No no leg swelling.  No recent travel hospitalization.  No knee swelling no ankle swelling.  No fever no chills.  No chest pain pressure heaviness cough congestion.  No syncope or near syncope.  She does wear orthotics.    Past history: CIDP, colon cancer, osteoporosis  Social: Patient denies current tobacco alcohol drug abuse.  REVIEW OF SYSTEMS:    GENERAL.: No weight loss, fatigue, anorexia, insomnia, fever.    EYES: No vision loss, double vision, drainage, eye pain.    ENT: No pharyngitis, dry mouth.    CARDIOPULMONARY: No chest pain, palpitations, syncope, near syncope. No shortness of breath, cough, hemoptysis.    GI: No abdominal pain, change in bowel habits, melena, hematemesis, hematochezia, nausea, vomiting, diarrhea.    : No discharge, dysuria, frequency, urgency, hematuria.    MS: No limb pain, bilateral knee and ankle pain, no, joint swelling.    SKIN: No rashes.    PSYCH: No depression, anxiety, suicidality, homicidality.    Review of systems is otherwise negative unless stated above or in history of present illness.  Social history, family history, allergies reviewed.  PHYSICAL EXAM:    GENERAL: Vitals noted, no distress. Alert and oriented  x 3. Non-toxic.      EENT: TMs clear. Posterior oropharynx unremarkable. No meningismus. No LAD.     NECK: Supple. Nontender. No midline tenderness.     CARDIAC: Regular, rate, rhythm. No murmurs rubs or gallops. No JVD    PULMONARY: Lungs clear bilaterally with good aeration. No wheezes rales or rhonchi. No respiratory distress.     ABDOMEN: Soft, nonsurgical. Nontender. No peritoneal signs. Normoactive  bowel sounds. No pulsatile masses.     EXTREMITIES: No peripheral edema. Negative Homans bilaterally, no cords.  2+ bounding pulses well-perfused, both ankles both knees have no obvious deformity no erythema redness swelling ecchymosis bruising no joint effusion good range of motion of both active and passive of both knees and both ankles.  Bounding pulses neurovascular intact.  Some contractures of the toes noticed which are chronic.  Calves are supple.  No evidence of Achilles tendon rupture.  No Achilles tendon pain.    SKIN: No rash. Intact.     NEURO: No focal neurologic deficits, NIH score of 0. Cranial nerves normal as tested from II through XII.     MEDICAL DECISION MAKING:  CBC with differential chemistries LFTs are normal ESR CRP is normal plain x-ray of the ankles negative.  Uric acid is normal.    Treatment in ED: None  ED course: Patient remains afebrile normotensive no tachycardia or hypoxia.  Impression: Bilateral knee/ankle pain most likely due to arthritis/mechanical musculoskeletal pain    Plans and MDM: 58-year-old female history of CIDP presents with bilateral knee and ankle pain atraumatic with a very benign examination no signs of a joint effusion and no erythema redness swelling ecchymosis bruising low concern for septic arthritis or gout or pseudogout or DVT, patient be discharged home advised supportive care close outpatient follow-up with primary doctor and neurologist and rheumatologist with strict and precaution.                          [place] Coma Scale Score: 15                     Patient History   Past Medical History:   Diagnosis Date    Chronic inflammatory demyelinating polyneuritis (Multi) 05/09/2016    CIDP (chronic inflammatory demyelinating polyneuropathy)    Encounter for general adult medical examination without abnormal findings 03/05/2018    Medicare annual wellness visit, subsequent    Encounter for screening for malignant neoplasm of colon 03/05/2018    Colon cancer  screening    Encounter for screening for osteoporosis 03/05/2018    Osteoporosis screening    Other conditions influencing health status 06/18/2015    History of cough    Other conditions influencing health status     No significant past medical history    Personal history of other diseases of the musculoskeletal system and connective tissue 04/09/2019    History of neck pain     Past Surgical History:   Procedure Laterality Date    APPENDECTOMY  06/19/2015    Appendectomy    HYSTERECTOMY  06/19/2015    Supracervical Hysterectomy     Family History   Problem Relation Name Age of Onset    Colon cancer Mother      Hypertension Mother      Other (CANCER OF NECK) Father      Hypertension Father      Colon cancer Other      Hypertension Other       Social History     Tobacco Use    Smoking status: Every Day     Current packs/day: 1.00     Average packs/day: 1 pack/day for 40.0 years (40.0 ttl pk-yrs)     Types: Cigarettes     Passive exposure: Never    Smokeless tobacco: Never    Tobacco comments:     Is trying to quit    Vaping Use    Vaping status: Never Used   Substance Use Topics    Alcohol use: Never    Drug use: Never       Physical Exam   ED Triage Vitals   Temperature Heart Rate Respirations BP   06/28/24 1243 06/28/24 1243 06/28/24 1243 06/28/24 1243   36.9 °C (98.4 °F) 81 18 142/84      Pulse Ox Temp Source Heart Rate Source Patient Position   06/28/24 1243 06/28/24 1243 06/28/24 1243 06/28/24 1243   96 % Temporal Monitor Standing      BP Location FiO2 (%)     06/28/24 1243 06/28/24 1434     Left arm 21 %       Physical Exam    ED Course & MDM   ED Course as of 06/28/24 1834   Fri Jun 28, 2024   1559 Patient CBC with differential chemistries LFTs are unremarkable CRP is normal ESR is very minimally elevated plain x-rays of the ankles are negative patient reassured be discharged home with supportive care outpatient follow-up with her primary care doctor or neurologist and rheumatologist with strict return  precaution. [MT]      ED Course User Index  [MT] Emigdio Richards MD         Diagnoses as of 06/28/24 1834   Acute bilateral ankle pain       Medical Decision Making      Procedure  Procedures     Emigdio Richards MD  06/28/24 2117

## 2024-07-09 ENCOUNTER — APPOINTMENT (OUTPATIENT)
Dept: PRIMARY CARE | Facility: CLINIC | Age: 59
End: 2024-07-09
Payer: MEDICARE

## 2024-07-09 VITALS
OXYGEN SATURATION: 98 % | SYSTOLIC BLOOD PRESSURE: 148 MMHG | RESPIRATION RATE: 16 BRPM | DIASTOLIC BLOOD PRESSURE: 82 MMHG | HEIGHT: 65 IN | WEIGHT: 193.6 LBS | HEART RATE: 76 BPM | BODY MASS INDEX: 32.26 KG/M2

## 2024-07-09 DIAGNOSIS — G89.29 OTHER CHRONIC PAIN: Chronic | ICD-10-CM

## 2024-07-09 DIAGNOSIS — M79.671 BILATERAL FOOT PAIN: ICD-10-CM

## 2024-07-09 DIAGNOSIS — M25.50 ARTHRALGIA, UNSPECIFIED JOINT: ICD-10-CM

## 2024-07-09 DIAGNOSIS — G61.81 CHRONIC INFLAMMATORY DEMYELINATING POLYNEUROPATHY (MULTI): ICD-10-CM

## 2024-07-09 DIAGNOSIS — M79.672 BILATERAL FOOT PAIN: ICD-10-CM

## 2024-07-09 DIAGNOSIS — R29.898 WEAKNESS OF BOTH LOWER EXTREMITIES: ICD-10-CM

## 2024-07-09 PROCEDURE — 3077F SYST BP >= 140 MM HG: CPT | Performed by: FAMILY MEDICINE

## 2024-07-09 PROCEDURE — 99214 OFFICE O/P EST MOD 30 MIN: CPT | Performed by: FAMILY MEDICINE

## 2024-07-09 PROCEDURE — 3079F DIAST BP 80-89 MM HG: CPT | Performed by: FAMILY MEDICINE

## 2024-07-09 RX ORDER — HYDROCODONE BITARTRATE AND ACETAMINOPHEN 7.5; 325 MG/1; MG/1
1 TABLET ORAL EVERY 6 HOURS PRN
Qty: 120 TABLET | Refills: 0 | Status: SHIPPED | OUTPATIENT
Start: 2024-09-18 | End: 2024-10-18

## 2024-07-09 RX ORDER — HYDROCODONE BITARTRATE AND ACETAMINOPHEN 7.5; 325 MG/1; MG/1
1 TABLET ORAL EVERY 6 HOURS PRN
Qty: 120 TABLET | Refills: 0 | Status: SHIPPED | OUTPATIENT
Start: 2024-07-20 | End: 2024-08-19

## 2024-07-09 RX ORDER — HYDROCODONE BITARTRATE AND ACETAMINOPHEN 7.5; 325 MG/1; MG/1
1 TABLET ORAL EVERY 6 HOURS PRN
Qty: 120 TABLET | Refills: 0 | Status: SHIPPED | OUTPATIENT
Start: 2024-08-19 | End: 2024-09-18

## 2024-07-09 RX ORDER — PREDNISONE 20 MG/1
TABLET ORAL
Qty: 18 TABLET | Refills: 0 | Status: SHIPPED | OUTPATIENT
Start: 2024-07-09

## 2024-07-09 ASSESSMENT — COLUMBIA-SUICIDE SEVERITY RATING SCALE - C-SSRS
1. IN THE PAST MONTH, HAVE YOU WISHED YOU WERE DEAD OR WISHED YOU COULD GO TO SLEEP AND NOT WAKE UP?: NO
2. HAVE YOU ACTUALLY HAD ANY THOUGHTS OF KILLING YOURSELF?: NO
6. HAVE YOU EVER DONE ANYTHING, STARTED TO DO ANYTHING, OR PREPARED TO DO ANYTHING TO END YOUR LIFE?: NO

## 2024-07-09 ASSESSMENT — ANXIETY QUESTIONNAIRES
4. TROUBLE RELAXING: NOT AT ALL
2. NOT BEING ABLE TO STOP OR CONTROL WORRYING: NOT AT ALL
6. BECOMING EASILY ANNOYED OR IRRITABLE: NOT AT ALL
1. FEELING NERVOUS, ANXIOUS, OR ON EDGE: NOT AT ALL
5. BEING SO RESTLESS THAT IT IS HARD TO SIT STILL: NOT AT ALL
3. WORRYING TOO MUCH ABOUT DIFFERENT THINGS: NOT AT ALL
GAD7 TOTAL SCORE: 0
7. FEELING AFRAID AS IF SOMETHING AWFUL MIGHT HAPPEN: NOT AT ALL

## 2024-07-09 ASSESSMENT — ENCOUNTER SYMPTOMS
LOSS OF SENSATION IN FEET: 0
DEPRESSION: 0
OCCASIONAL FEELINGS OF UNSTEADINESS: 0

## 2024-07-09 ASSESSMENT — PATIENT HEALTH QUESTIONNAIRE - PHQ9
1. LITTLE INTEREST OR PLEASURE IN DOING THINGS: NOT AT ALL
2. FEELING DOWN, DEPRESSED OR HOPELESS: NOT AT ALL
SUM OF ALL RESPONSES TO PHQ9 QUESTIONS 1 AND 2: 0

## 2024-07-09 ASSESSMENT — PAIN SCALES - GENERAL: PAINLEVEL: 6

## 2024-07-09 NOTE — PATIENT INSTRUCTIONS
Diagnoses and all orders for this visit:  Chronic inflammatory demyelinating polyneuropathy (Multi)  -     Follow Up In [facility] - PCP - Established  -     predniSONE (Deltasone) 20 mg tablet; Take 3 tabs (60mg) daily for 3 days, then take 2 tabs (40mg) daily for 3 days, then take 1 tab (20mg) daily for 3 days.  Arthralgia, unspecified joint  -     HYDROcodone-acetaminophen (Norco) 7.5-325 mg tablet; Take 1 tablet by mouth every 6 hours if needed for severe pain (7 - 10). Do not fill before August 19, 2024.  -     HYDROcodone-acetaminophen (Norco) 7.5-325 mg tablet; Take 1 tablet by mouth every 6 hours if needed for severe pain (7 - 10). Do not fill before September 18, 2024.  -     predniSONE (Deltasone) 20 mg tablet; Take 3 tabs (60mg) daily for 3 days, then take 2 tabs (40mg) daily for 3 days, then take 1 tab (20mg) daily for 3 days.  -     HYDROcodone-acetaminophen (Norco) 7.5-325 mg tablet; Take 1 tablet by mouth every 6 hours if needed for severe pain (7 - 10). Do not fill before July 20, 2024.  Other chronic pain  -     HYDROcodone-acetaminophen (Norco) 7.5-325 mg tablet; Take 1 tablet by mouth every 6 hours if needed for severe pain (7 - 10). Do not fill before August 19, 2024.  -     HYDROcodone-acetaminophen (Norco) 7.5-325 mg tablet; Take 1 tablet by mouth every 6 hours if needed for severe pain (7 - 10). Do not fill before September 18, 2024.  -     predniSONE (Deltasone) 20 mg tablet; Take 3 tabs (60mg) daily for 3 days, then take 2 tabs (40mg) daily for 3 days, then take 1 tab (20mg) daily for 3 days.  -     HYDROcodone-acetaminophen (Norco) 7.5-325 mg tablet; Take 1 tablet by mouth every 6 hours if needed for severe pain (7 - 10). Do not fill before July 20, 2024.  Bilateral foot pain  -     HYDROcodone-acetaminophen (Norco) 7.5-325 mg tablet; Take 1 tablet by mouth every 6 hours if needed for severe pain (7 - 10). Do not fill before August 19, 2024.  -     HYDROcodone-acetaminophen  (Norco) 7.5-325 mg tablet; Take 1 tablet by mouth every 6 hours if needed for severe pain (7 - 10). Do not fill before September 18, 2024.  -     predniSONE (Deltasone) 20 mg tablet; Take 3 tabs (60mg) daily for 3 days, then take 2 tabs (40mg) daily for 3 days, then take 1 tab (20mg) daily for 3 days.  -     HYDROcodone-acetaminophen (Norco) 7.5-325 mg tablet; Take 1 tablet by mouth every 6 hours if needed for severe pain (7 - 10). Do not fill before July 20, 2024.  Weakness of both lower extremities  -     HYDROcodone-acetaminophen (Norco) 7.5-325 mg tablet; Take 1 tablet by mouth every 6 hours if needed for severe pain (7 - 10). Do not fill before August 19, 2024.  -     HYDROcodone-acetaminophen (Norco) 7.5-325 mg tablet; Take 1 tablet by mouth every 6 hours if needed for severe pain (7 - 10). Do not fill before September 18, 2024.

## 2024-07-09 NOTE — PROGRESS NOTES
Subjective   Patient ID: La Donnelly is a 58 y.o. female.    HPI  58 year old female for follow up. She is having a lot of pain in her legs. Left is worse. She was having rubbing pain from old AFO, and got new AFO a few weeks ago, and is not up to wearing all day. Being on her feet aggravates. OARRS:  Laura Ward MD on 7/9/2024  1:06 PM  I have personally reviewed the OARRS report for La Donnelly. I have considered the risks of abuse, dependence, addiction and diversion and I believe that it is clinically appropriate for La Donnelly to be prescribed this medication    Is the patient prescribed a combination of a benzodiazepine and opioid?  No    Last Urine Drug Screen / ordered today: Yes  Recent Results (from the past 8760 hour(s))   Confirmation Opiate/Opioid/Benzo Prescription Compliance    Collection Time: 01/02/24  9:58 AM   Result Value Ref Range    Clonazepam <25 <25 ng/mL    7-Aminoclonazepam <25 <25 ng/mL    Alprazolam <25 <25 ng/mL    Alpha-Hydroxyalprazolam <25 <25 ng/mL    Midazolam <25 <25 ng/mL    Alpha-Hydroxymidazolam <25 <25 ng/mL    Chlordiazepoxide <25 <25 ng/mL    Diazepam <25 <25 ng/mL    Nordiazepam <25 <25 ng/mL    Temazepam <25 <25 ng/mL    Oxazepam <25 <25 ng/mL    Lorazepam <25 <25 ng/mL    Methadone <25 <25 ng/mL    EDDP <25 <25 ng/mL    6-Acetylmorphine <25 <25 ng/mL    Codeine <50 <50 ng/mL    Hydrocodone 519 (H) <25 ng/mL    Hydromorphone 373 (H) <25 ng/mL    Morphine  <50 <50 ng/mL    Norhydrocodone 574 (H) <25 ng/mL    Noroxycodone <25 <25 ng/mL    Oxycodone <25 <25 ng/mL    Oxymorphone <25 <25 ng/mL    Fentanyl <2.5 <2.5 ng/mL    Norfentanyl <2.5 <2.5 ng/mL    Tramadol <50 <50 ng/mL    O-Desmethyltramadol <50 <50 ng/mL    Zolpidem <25 <25 ng/mL    Zolpidem Metabolite (ZCA) <25 <25 ng/mL     Results are as expected.         Controlled Substance Agreement:  Date of the Last Agreement: 1/2024  Reviewed Controlled Substance Agreement including but not limited to the  benefits, risks, and alternatives to treatment with a Controlled Substance medication(s).    Opioids:  What is the patient's goal of therapy? Improved pain  Is this being achieved with current treatment? yes    I have calculated the patient's Morphine Dose Equivalent (MED):   I have considered referral to Pain Management and/or a specialist, and do not feel it is necessary at this time.    I feel that it is clinically indicated to continue this current medication regimen after consideration of alternative therapies, and other non-opioid treatment.    Opioid Risk Screening:  No data recorded    Pain Assessment:  No data recorded  Has seen Specialist since last visit. Reflexes are hyper. Thought that new AFO's would fix this. Over compensating for left. She is trying to keep braces on as long as she can.   Review of Systems   All other systems reviewed and are negative.      Objective   Physical Exam  Vitals reviewed.   Constitutional:       Appearance: Normal appearance.   HENT:      Head: Normocephalic and atraumatic.      Right Ear: Tympanic membrane normal.      Left Ear: Tympanic membrane normal.      Nose: Nose normal.      Mouth/Throat:      Mouth: Mucous membranes are moist.      Pharynx: Oropharynx is clear.   Eyes:      Extraocular Movements: Extraocular movements intact.      Conjunctiva/sclera: Conjunctivae normal.      Pupils: Pupils are equal, round, and reactive to light.   Cardiovascular:      Rate and Rhythm: Normal rate and regular rhythm.      Pulses: Normal pulses.      Heart sounds: Normal heart sounds.   Pulmonary:      Effort: Pulmonary effort is normal.      Breath sounds: Normal breath sounds.   Abdominal:      General: Abdomen is flat. Bowel sounds are normal.      Palpations: Abdomen is soft.   Musculoskeletal:         General: Tenderness, deformity and signs of injury present. Normal range of motion.      Cervical back: Normal range of motion and neck supple.      Comments: Pain in legs,  bilateral feet, extending to tibialis. No visible swellinbg   Skin:     General: Skin is warm and dry.      Capillary Refill: Capillary refill takes less than 2 seconds.   Neurological:      General: No focal deficit present.      Mental Status: She is alert and oriented to person, place, and time.      Motor: Weakness present.      Gait: Gait abnormal.   Psychiatric:         Mood and Affect: Mood normal.         Behavior: Behavior normal.       Assessment/Plan   Diagnoses and all orders for this visit:  Chronic inflammatory demyelinating polyneuropathy (Multi)  -     Follow Up In [facility] - PCP - Established  -     predniSONE (Deltasone) 20 mg tablet; Take 3 tabs (60mg) daily for 3 days, then take 2 tabs (40mg) daily for 3 days, then take 1 tab (20mg) daily for 3 days.  Arthralgia, unspecified joint  -     HYDROcodone-acetaminophen (Norco) 7.5-325 mg tablet; Take 1 tablet by mouth every 6 hours if needed for severe pain (7 - 10). Do not fill before August 19, 2024.  -     HYDROcodone-acetaminophen (Norco) 7.5-325 mg tablet; Take 1 tablet by mouth every 6 hours if needed for severe pain (7 - 10). Do not fill before September 18, 2024.  -     predniSONE (Deltasone) 20 mg tablet; Take 3 tabs (60mg) daily for 3 days, then take 2 tabs (40mg) daily for 3 days, then take 1 tab (20mg) daily for 3 days.  -     HYDROcodone-acetaminophen (Norco) 7.5-325 mg tablet; Take 1 tablet by mouth every 6 hours if needed for severe pain (7 - 10). Do not fill before July 20, 2024.  Other chronic pain  -     HYDROcodone-acetaminophen (Norco) 7.5-325 mg tablet; Take 1 tablet by mouth every 6 hours if needed for severe pain (7 - 10). Do not fill before August 19, 2024.  -     HYDROcodone-acetaminophen (Norco) 7.5-325 mg tablet; Take 1 tablet by mouth every 6 hours if needed for severe pain (7 - 10). Do not fill before September 18, 2024.  -     predniSONE (Deltasone) 20 mg tablet; Take 3 tabs (60mg) daily for 3 days, then take  2 tabs (40mg) daily for 3 days, then take 1 tab (20mg) daily for 3 days.  -     HYDROcodone-acetaminophen (Norco) 7.5-325 mg tablet; Take 1 tablet by mouth every 6 hours if needed for severe pain (7 - 10). Do not fill before July 20, 2024.  Bilateral foot pain  -     HYDROcodone-acetaminophen (Norco) 7.5-325 mg tablet; Take 1 tablet by mouth every 6 hours if needed for severe pain (7 - 10). Do not fill before August 19, 2024.  -     HYDROcodone-acetaminophen (Norco) 7.5-325 mg tablet; Take 1 tablet by mouth every 6 hours if needed for severe pain (7 - 10). Do not fill before September 18, 2024.  -     predniSONE (Deltasone) 20 mg tablet; Take 3 tabs (60mg) daily for 3 days, then take 2 tabs (40mg) daily for 3 days, then take 1 tab (20mg) daily for 3 days.  -     HYDROcodone-acetaminophen (Norco) 7.5-325 mg tablet; Take 1 tablet by mouth every 6 hours if needed for severe pain (7 - 10). Do not fill before July 20, 2024.  Weakness of both lower extremities  -     HYDROcodone-acetaminophen (Norco) 7.5-325 mg tablet; Take 1 tablet by mouth every 6 hours if needed for severe pain (7 - 10). Do not fill before August 19, 2024.  -     HYDROcodone-acetaminophen (Norco) 7.5-325 mg tablet; Take 1 tablet by mouth every 6 hours if needed for severe pain (7 - 10). Do not fill before September 18, 2024.    These are stable but warrant attempts to try and treat.

## 2024-07-23 ENCOUNTER — TELEPHONE (OUTPATIENT)
Dept: PRIMARY CARE | Facility: CLINIC | Age: 59
End: 2024-07-23
Payer: MEDICARE

## 2024-07-23 DIAGNOSIS — B37.0 ORAL THRUSH: Primary | ICD-10-CM

## 2024-07-23 RX ORDER — NYSTATIN 100000 [USP'U]/ML
500000 SUSPENSION ORAL 4 TIMES DAILY
Qty: 280 ML | Refills: 0 | Status: SHIPPED | OUTPATIENT
Start: 2024-07-23 | End: 2024-08-06

## 2024-07-23 NOTE — TELEPHONE ENCOUNTER
Patient has thrush after finishing a round of prednisone requesting a mouth was please advise     Was told liquid for this medication is in short supply     [id number] - [place], [place] - [address] AT [place] [address]   [address], [place] [address]  Phone: [phone]  Fax: [phone]  SHAHLA #: [id number]

## 2024-07-30 PROCEDURE — 99284 EMERGENCY DEPT VISIT MOD MDM: CPT | Mod: 25

## 2024-07-30 PROCEDURE — 99284 EMERGENCY DEPT VISIT MOD MDM: CPT | Performed by: EMERGENCY MEDICINE

## 2024-07-30 ASSESSMENT — PAIN DESCRIPTION - LOCATION: LOCATION: FOOT

## 2024-07-30 ASSESSMENT — PAIN - FUNCTIONAL ASSESSMENT: PAIN_FUNCTIONAL_ASSESSMENT: 0-10

## 2024-07-30 ASSESSMENT — PAIN DESCRIPTION - PAIN TYPE: TYPE: ACUTE PAIN

## 2024-07-30 ASSESSMENT — PAIN SCALES - GENERAL: PAINLEVEL_OUTOF10: 5 - MODERATE PAIN

## 2024-07-31 ENCOUNTER — APPOINTMENT (OUTPATIENT)
Dept: RADIOLOGY | Facility: HOSPITAL | Age: 59
End: 2024-07-31
Payer: MEDICARE

## 2024-07-31 ENCOUNTER — HOSPITAL ENCOUNTER (EMERGENCY)
Facility: HOSPITAL | Age: 59
Discharge: HOME | End: 2024-07-31
Attending: EMERGENCY MEDICINE
Payer: MEDICARE

## 2024-07-31 VITALS
OXYGEN SATURATION: 97 % | HEIGHT: 68 IN | RESPIRATION RATE: 16 BRPM | SYSTOLIC BLOOD PRESSURE: 158 MMHG | HEART RATE: 88 BPM | TEMPERATURE: 98.8 F | BODY MASS INDEX: 25.01 KG/M2 | DIASTOLIC BLOOD PRESSURE: 76 MMHG | WEIGHT: 165 LBS

## 2024-07-31 DIAGNOSIS — M79.604 BILATERAL LOWER EXTREMITY PAIN: Primary | ICD-10-CM

## 2024-07-31 DIAGNOSIS — M79.605 BILATERAL LOWER EXTREMITY PAIN: Primary | ICD-10-CM

## 2024-07-31 LAB
ALBUMIN SERPL BCP-MCNC: 3.9 G/DL (ref 3.4–5)
ALP SERPL-CCNC: 72 U/L (ref 33–136)
ALT SERPL W P-5'-P-CCNC: 16 U/L (ref 7–45)
ANION GAP SERPL CALC-SCNC: 13 MMOL/L (ref 10–20)
AST SERPL W P-5'-P-CCNC: 24 U/L (ref 9–39)
BASOPHILS # BLD AUTO: 0.04 X10*3/UL (ref 0–0.1)
BASOPHILS NFR BLD AUTO: 0.5 %
BILIRUB SERPL-MCNC: 0.6 MG/DL (ref 0–1.2)
BUN SERPL-MCNC: 16 MG/DL (ref 6–23)
CALCIUM SERPL-MCNC: 9.5 MG/DL (ref 8.6–10.6)
CHLORIDE SERPL-SCNC: 100 MMOL/L (ref 98–107)
CO2 SERPL-SCNC: 30 MMOL/L (ref 21–32)
CREAT SERPL-MCNC: 0.48 MG/DL (ref 0.5–1.05)
CRP SERPL-MCNC: 0.43 MG/DL
EGFRCR SERPLBLD CKD-EPI 2021: >90 ML/MIN/1.73M*2
EOSINOPHIL # BLD AUTO: 0.35 X10*3/UL (ref 0–0.7)
EOSINOPHIL NFR BLD AUTO: 4.7 %
ERYTHROCYTE [DISTWIDTH] IN BLOOD BY AUTOMATED COUNT: 12.9 % (ref 11.5–14.5)
ERYTHROCYTE [SEDIMENTATION RATE] IN BLOOD BY WESTERGREN METHOD: 36 MM/H (ref 0–30)
GLUCOSE SERPL-MCNC: 110 MG/DL (ref 74–99)
HCT VFR BLD AUTO: 41.3 % (ref 36–46)
HGB BLD-MCNC: 13.3 G/DL (ref 12–16)
IMM GRANULOCYTES # BLD AUTO: 0.01 X10*3/UL (ref 0–0.7)
IMM GRANULOCYTES NFR BLD AUTO: 0.1 % (ref 0–0.9)
LYMPHOCYTES # BLD AUTO: 2.61 X10*3/UL (ref 1.2–4.8)
LYMPHOCYTES NFR BLD AUTO: 35 %
MCH RBC QN AUTO: 30.3 PG (ref 26–34)
MCHC RBC AUTO-ENTMCNC: 32.2 G/DL (ref 32–36)
MCV RBC AUTO: 94 FL (ref 80–100)
MONOCYTES # BLD AUTO: 0.54 X10*3/UL (ref 0.1–1)
MONOCYTES NFR BLD AUTO: 7.2 %
NEUTROPHILS # BLD AUTO: 3.9 X10*3/UL (ref 1.2–7.7)
NEUTROPHILS NFR BLD AUTO: 52.5 %
NRBC BLD-RTO: 0 /100 WBCS (ref 0–0)
PLATELET # BLD AUTO: 287 X10*3/UL (ref 150–450)
POTASSIUM SERPL-SCNC: 3.7 MMOL/L (ref 3.5–5.3)
PROT SERPL-MCNC: 7 G/DL (ref 6.4–8.2)
RBC # BLD AUTO: 4.39 X10*6/UL (ref 4–5.2)
SODIUM SERPL-SCNC: 139 MMOL/L (ref 136–145)
URATE SERPL-MCNC: 4.3 MG/DL (ref 2.3–6.7)
WBC # BLD AUTO: 7.5 X10*3/UL (ref 4.4–11.3)

## 2024-07-31 PROCEDURE — 96374 THER/PROPH/DIAG INJ IV PUSH: CPT

## 2024-07-31 PROCEDURE — 84550 ASSAY OF BLOOD/URIC ACID: CPT

## 2024-07-31 PROCEDURE — 36415 COLL VENOUS BLD VENIPUNCTURE: CPT

## 2024-07-31 PROCEDURE — 2500000004 HC RX 250 GENERAL PHARMACY W/ HCPCS (ALT 636 FOR OP/ED)

## 2024-07-31 PROCEDURE — 85652 RBC SED RATE AUTOMATED: CPT

## 2024-07-31 PROCEDURE — 93971 EXTREMITY STUDY: CPT | Performed by: STUDENT IN AN ORGANIZED HEALTH CARE EDUCATION/TRAINING PROGRAM

## 2024-07-31 PROCEDURE — 85025 COMPLETE CBC W/AUTO DIFF WBC: CPT

## 2024-07-31 PROCEDURE — 82374 ASSAY BLOOD CARBON DIOXIDE: CPT

## 2024-07-31 PROCEDURE — 93970 EXTREMITY STUDY: CPT

## 2024-07-31 PROCEDURE — 86140 C-REACTIVE PROTEIN: CPT

## 2024-07-31 RX ORDER — KETOROLAC TROMETHAMINE 15 MG/ML
15 INJECTION, SOLUTION INTRAMUSCULAR; INTRAVENOUS ONCE
Status: COMPLETED | OUTPATIENT
Start: 2024-07-31 | End: 2024-07-31

## 2024-07-31 ASSESSMENT — PAIN DESCRIPTION - PROGRESSION: CLINICAL_PROGRESSION: GRADUALLY IMPROVING

## 2024-07-31 ASSESSMENT — PAIN SCALES - GENERAL: PAINLEVEL_OUTOF10: 3

## 2024-07-31 ASSESSMENT — PAIN - FUNCTIONAL ASSESSMENT: PAIN_FUNCTIONAL_ASSESSMENT: 0-10

## 2024-07-31 NOTE — DISCHARGE INSTRUCTIONS
You were seen today in the emergency department for bilateral lower extremity pain.  Please follow-up with your outpatient neurologist and primary care physician as soon as possible.  You were given a referral for rheumatology we will schedule an appointment for you.  Please continue to take your at home pain medications in the meantime.  You do not have a deep venous thrombosis in your lower extremities today.  There is low concern for an infectious etiology of your lower extremity pain.

## 2024-07-31 NOTE — ED PROVIDER NOTES
CC: Bilateral Foot Pain     History provided by: Patient  Limitations to History: None    HPI:    Patient is a 58-year-old female with a PMH of chronic inflammatory demyelinating polyneuropathy (on weekly subQ IgG and norco) and asthma who presents to the emergency department for bilateral foot pain.  Patient reports that her foot pain is sharp/burning in nature running from her feet to her mid shins.  She reports this is the typical nature of her chronic pain but has been increased over the last month. Of note she was seen in the [facility] ED one month prior for the same symptoms with a largely unremarkable workup.  The patient denies traumatic injury.  She denies recent hospitalizations, recent surgery, prior thromboembolism, exogenous estrogen use, fevers, chills, chest pain, or shortness of breath.    External Records Reviewed: Previous ED records, inpatient records, and outpatient records  ???????????????????????????????????????????????????????????????  Triage Vitals:  T 37.1 °C (98.8 °F)    /78  RR 18  O2 95 % None (Room air)    Physical Exam  Constitutional:       General: She is awake. She is not in acute distress.     Appearance: She is not ill-appearing, toxic-appearing or diaphoretic.   Cardiovascular:      Rate and Rhythm: Normal rate and regular rhythm.      Pulses:           Radial pulses are 2+ on the right side and 2+ on the left side.        Dorsalis pedis pulses are 2+ on the right side and 2+ on the left side.      Heart sounds: Normal heart sounds, S1 normal and S2 normal. Heart sounds not distant. No murmur heard.     No friction rub.   Pulmonary:      Effort: Pulmonary effort is normal. No prolonged expiration or respiratory distress.      Breath sounds: Normal breath sounds.      Comments: Lungs are clear to auscultation without evidence of wheezing, crackles, rhonchi.  Abdominal:      General: Abdomen is flat. There is no distension.      Palpations: Abdomen is soft.       Tenderness: There is no abdominal tenderness. There is no guarding or rebound.   Musculoskeletal:      Right lower leg: No edema.      Left lower leg: No edema.      Comments: Mild edema to left ankle.  There is 5/5 strength in ankle dorsiflexion and plantarflexion bilaterally.  Sensation is intact in all nerve distributions of the bilateral lower extremities.  There is 5/5 strength in hip flexion and extension.   Skin:     General: Skin is warm and dry.      Capillary Refill: Capillary refill takes less than 2 seconds.   Neurological:      Mental Status: She is alert and oriented to person, place, and time.   Psychiatric:         Behavior: Behavior is cooperative.        ???????????????????????????????????????????????????????????????  ED Course/Treatment/Medical Decision Making      Differential diagnoses considered include but ar not limited to: Neuropathy, DVT, septic arthritis, traumatic ankle injury    Social Determinants Limiting Care:  None identified         ED Course:  Diagnoses as of 07/31/24 0613   Bilateral lower extremity pain       MDM:    Patient is a 58-year-old female with above PMH who presents to the emergency department for bilateral foot pain.  Upon arrival patient's vital signs remarkable for hypertension, she is nontoxic-appearing, and appears no acute distress.  Low concern for infection or inflammatory arthritis today given history of symptoms and chronicity in nature.  ESR is elevated at 36 which is the same value from the patient's previous ED visit approximately 1 month ago.  C-reactive protein and uric acid are within normal limits.  CBC is without leukocytosis or anemia.  CMP is within normal limits.  Bilateral duplex ultrasounds of the lower extremity are without DVT.  The patient's pain be treated with Toradol.  Upon reevaluation the patient's pain is mildly improved.  I offered the patient a prescription for gabapentin but she says that she has tried this in the past and does not  like the way it makes her feel.  The patient was discharged home in stable condition and instructed to follow-up with her outpatient peripheral neurologist.  Additionally she was given a referral for rheumatology and instructed to continue taking her at home Norco.    Impression:  Bilateral lower extremity pain    Disposition:  Discharge home    Patient was staffed and discussed with ED attending Dr. Peter Haskins, DO   Emergency Medicine, PGY-2      Procedures ? [employer] last updated 7/31/2024 12:40 AM          Jonathan Haskins DO  Resident  08/02/24 1240       Rudy Green MD  08/03/24 1353

## 2024-07-31 NOTE — ED TRIAGE NOTES
Patient is complaining of bilateral foot pain for the last month. Patient states she chronic inflammatory demethylating polyneuropathy but she does not think that is what this is, she was prescribed steroids for this but she is done with the course and no longer taking any steroids.

## 2024-08-04 NOTE — PROGRESS NOTES
Subjective   Patient ID: [id number]   La Donnelly is a 58 y.o. female presents due to bilateral foot pain.       HPI  58F presents due to b/l ankle pain.     Patient reports in 2005 she had wrist, hand, elbow knees, and at that time she reports she was told she has RA and was given Enbrel. Subsequently after two injections, she developed worsening LE balance, and later was diagnosed with CIDP. She has been on SubQ IVIG weekly. She states she was subsequently given HQ, and she can't remember if she really consistently took the medication. It sounds like she has been off of medication for more than 10 years. She denies any morning stiffness, swelling, hand deformities.     Patient has b/l foot drop, recently obtained b/l ankle bracing. She has had them for about one month. She was told to gradually increase wearing her supported shoes. However reports she has progressively developed severe ankle pain due the ankle braces. The pain has been so severe that she has had two ED visits. She describes it as sharp, it would last all day. 10/10 pain. Reports avoiding the ankle braces improves her pain. She denies any significant ankle pain in the morning. She denies any blurry vision, double vision, sob, hand arthralgia, back stiffness, bloody stool, new skin rashes. Denies history of IBD.     ED course for b/l foot pain on 7/31/2024  Patient is a 58-year-old female with a PMH of chronic inflammatory demyelinating polyneuropathy (on weekly subQ IgG and norco) and asthma who presents to the emergency department for bilateral foot pain.  Patient reports that her foot pain is sharp/burning in nature running from her feet to her mid shins.  She reports this is the typical nature of her chronic pain but has been increased over the last month. Of note she was seen in the [facility] ED one month prior for the same symptoms with a largely unremarkable workup.  The patient denies traumatic injury.  She denies recent  hospitalizations, recent surgery, prior thromboembolism, exogenous estrogen use, fevers, chills, chest pain, or shortness of breath. CBC/CMP unremarkable. ESR 36, C-reactive protein and uric acid are within normal limits. Bilateral duplex ultrasounds of the lower extremity are without DVT. Patient was treated with conservative measures and discharged    ROS:  As per [facility]     Patient Active Problem List   Diagnosis    Allergic rhinitis    Anxiety    Myofascial muscle pain    Asthma (HHS-HCC)    Bilateral foot pain    Bronchitis, complicated    Chronic inflammatory demyelinating polyneuropathy (Multi)    Combined hyperlipidemia    Depression, recurrent (CMS-HCC)    Hypertension    Increased serum lipids    Cervicalgia    Osteopenia    Rheumatoid arthritis (Multi)    Vitamin D deficiency    Abnormal CT scan of lung    Cigarette nicotine dependence without complication    Uncomplicated opioid dependence (Multi)    Acquired equinovarus deformity of left foot    Acquired equinovarus deformity of right foot    Chronic obstructive lung disease (Multi)    Cigarette smoker    Leg weakness    Pneumonia    Radicular pain in right arm    Abnormal computerized axial tomography of chest    Asthma without status asthmaticus (HHS-HCC)    Rib pain on right side    Chronic pain    Cervical facet syndrome    Overweight with body mass index (BMI) of 25 to 25.9 in adult    Paraparesis (Multi)    Arthralgia    Chronic obstructive pulmonary disease with acute exacerbation (Multi)    Long-term current use of intravenous immunoglobulin (IVIG)        Past Medical History:   Diagnosis Date    Asthma ([facility]-HCC)     Chronic inflammatory demyelinating polyneuritis (Multi) 05/09/2016    CIDP (chronic inflammatory demyelinating polyneuropathy)    Encounter for general adult medical examination without abnormal findings 03/05/2018    Medicare annual wellness visit, subsequent    Encounter for screening for malignant neoplasm of colon 03/05/2018     Colon cancer screening    Encounter for screening for osteoporosis 03/05/2018    Osteoporosis screening    Other conditions influencing health status 06/18/2015    History of cough    Other conditions influencing health status     No significant past medical history    Personal history of other diseases of the musculoskeletal system and connective tissue 04/09/2019    History of neck pain        Past Surgical History:   Procedure Laterality Date    APPENDECTOMY  06/19/2015    Appendectomy    HYSTERECTOMY  06/19/2015    Supracervical Hysterectomy        Social History     Socioeconomic History    Marital status:      Spouse name: Not on file    Number of children: Not on file    Years of education: Not on file    Highest education level: Not on file   Occupational History    Not on file   Tobacco Use    Smoking status: Every Day     Current packs/day: 1.00     Average packs/day: 1 pack/day for 40.0 years (40.0 ttl pk-yrs)     Types: Cigarettes     Passive exposure: Never    Smokeless tobacco: Never    Tobacco comments:     Is trying to quit    Vaping Use    Vaping status: Never Used   Substance and Sexual Activity    Alcohol use: Never    Drug use: Never    Sexual activity: Defer   Other Topics Concern    Not on file   Social History Narrative    Not on file     Social Determinants of Health     Financial Resource Strain: Not on file   Food Insecurity: Not on file   Transportation Needs: Not on file   Physical Activity: Not on file   Stress: Not on file   Social Connections: Not on file   Intimate Partner Violence: Not on file   Housing Stability: Not on file        Allergies   Allergen Reactions    Etanercept Other    Metoprolol Other     MUSCLE WEAKNESS    Nifedipine Wheezing          Current Outpatient Medications:     Advair Diskus 100-50 mcg/dose diskus inhaler, Inhale 1 puff 2 times a day., Disp: , Rfl:     albuterol 0.63 mg/3 mL nebulizer solution, Take 3 mL (0.63 mg) by nebulization see administration  instructions. USE 1 UNIT DOSE IN NEBULIZER EVERY 4 TO 6 HOURS AS NEEDED., Disp: , Rfl:     albuterol 90 mcg/actuation inhaler, Inhale 2 puffs 4 times a day., Disp: , Rfl:     cholecalciferol (Vitamin D-3) 50 MCG (2000 UT) tablet, once daily., Disp: , Rfl:     cyclobenzaprine (Flexeril) 10 mg tablet, Take 1 tablet (10 mg) by mouth 2 times a day as needed for muscle spasms., Disp: 180 tablet, Rfl: 1    diphenhydrAMINE (BENADryl) 25 mg capsule, Take 1 capsule (25 mg) by mouth every 6 hours if needed., Disp: , Rfl:     EPINEPHrine (EpiPen 2-Kalyan) 0.3 mg/0.3 mL injection syringe, Inject 0.3 mL (0.3 mg) as directed if needed., Disp: , Rfl:     fluticasone (Flonase) 50 mcg/actuation nasal spray, Administer 2 sprays into each nostril once daily., Disp: , Rfl:     [START ON 8/19/2024] HYDROcodone-acetaminophen (Norco) 7.5-325 mg tablet, Take 1 tablet by mouth every 6 hours if needed for severe pain (7 - 10). Do not fill before August 19, 2024., Disp: 120 tablet, Rfl: 0    [START ON 9/18/2024] HYDROcodone-acetaminophen (Norco) 7.5-325 mg tablet, Take 1 tablet by mouth every 6 hours if needed for severe pain (7 - 10). Do not fill before September 18, 2024., Disp: 120 tablet, Rfl: 0    HYDROcodone-acetaminophen (Norco) 7.5-325 mg tablet, Take 1 tablet by mouth every 6 hours if needed for severe pain (7 - 10). Do not fill before July 20, 2024., Disp: 120 tablet, Rfl: 0    ibuprofen 200 mg tablet, Take 1 tablet (200 mg) by mouth every 6 hours if needed for mild pain (1 - 3)., Disp: , Rfl:     immune globulin, human, (Hizentra) subcutaneous infusion, Inject 20 mL (4,000 mg) under the skin 1 (one) time per week., Disp: , Rfl:     losartan-hydrochlorothiazide (Hyzaar) 100-25 mg tablet, Take 1 tablet by mouth once daily., Disp: 90 tablet, Rfl: 3    magnesium oxide 400 mg magnesium capsule, Take 1 capsule (400 mg) by mouth., Disp: , Rfl:     naloxone (Narcan) 4 mg/0.1 mL nasal spray, Administer 1 spray (4 mg) into affected nostril(s)  if needed for opioid reversal for up to 1 dose. May repeat every 2-3 minutes if needed, alternating nostrils, until medical assistance becomes available., Disp: 1 each, Rfl: 0    nystatin (Mycostatin) 100,000 unit/mL suspension, Take 5 mL (500,000 Units) by mouth 4 times a day for 14 days. Swish in mouth and swallow., Disp: 280 mL, Rfl: 0    predniSONE (Deltasone) 1 mg tablet, Take 3 tablets (3 mg) by mouth once daily., Disp: 270 tablet, Rfl: 3    predniSONE (Deltasone) 20 mg tablet, Take 3 tabs (60mg) daily for 3 days, then take 2 tabs (40mg) daily for 3 days, then take 1 tab (20mg) daily for 3 days., Disp: 18 tablet, Rfl: 0     Objective     There were no vitals taken for this visit.     Physical Exam:  General - NAD, sitting up in chair, well-groomed, pleasant, AAOx3  Mouth/ENT - Moist oral and nasal mucosa. No facial rash. No enlarged parotid or submandibular gland. Adequate salivary pooling.  Cardiovascular -  Regular rate and rhythm. No murmurs or rubs.  Lungs - Clear to auscultation bilaterally.   Skin - No rashes or ulcers. Skin warm and dry. No erythema on bilateral cheeks.  Abdomen - Soft, non-tender. No masses. Normal bowel sounds.  Neurological - Alert and oriented x 3. UE strength 5/5, LLE strength 5/5, RLE strength 5/5    Musculoskeletal  Shoulders: Full ROM, without pain, no swelling, warmth or tenderness.  Elbows: Full ROM, without pain, no swelling, warmth or tenderness.  Wrists: Full ROM, without pain, no swelling, warmth or tenderness.  MCP: No swelling, warmth or tenderness. Metacarpal squeeze negative  PIP: No swelling, warmth or tenderness.  DIP: No swelling, warmth or tenderness.  Hands : 5/5.    Sacroiliac joints: No local tenderness. TETO negative.   Knees:  Full ROM, without pain, no swelling, warmth or point tenderness.  Ankles:   R. Ankle, no swelling, erythema or warmth  L. Ankle, Varus deformity, 1+ le edema, no evidence of synovitis   Toes: No swelling, warmth or tenderness.  Metatarsal squeeze negative  Cervical spine: No tenderness or limitation of movement  Lumbar spine: No tenderness or limitation of movement     Lab Results   Component Value Date    WBC 7.5 07/31/2024    HGB 13.3 07/31/2024    HCT 41.3 07/31/2024    MCV 94 07/31/2024     07/31/2024        Chemistry    Lab Results   Component Value Date/Time     07/31/2024 0136    K 3.7 07/31/2024 0136     07/31/2024 0136    CO2 30 07/31/2024 0136    BUN 16 07/31/2024 0136    CREATININE 0.48 (L) 07/31/2024 0136    Lab Results   Component Value Date/Time    CALCIUM 9.5 07/31/2024 0136    ALKPHOS 72 07/31/2024 0136    AST 24 07/31/2024 0136    ALT 16 07/31/2024 0136    BILITOT 0.6 07/31/2024 0136           Lab Results   Component Value Date    CRP 0.43 07/31/2024      Lab Results   Component Value Date    SEDRATE 36 (H) 07/31/2024      Lab Results   Component Value Date    ALT 16 07/31/2024    AST 24 07/31/2024    ALKPHOS 72 07/31/2024    BILITOT 0.6 07/31/2024      Lab Results   Component Value Date    URICACID 4.3 07/31/2024        Vascular US lower extremity venous duplex bilateral  Narrative: Interpreted By:  Yared Reilly and Liller Gregory   STUDY:  [facility] US LOWER EXTREMITY VENOUS DUPLEX BILATERAL;  7/31/2024 2:50 am      INDICATION:  Signs/Symptoms:left lower extremity edema.      COMPARISON:  Lower extremity venous ultrasound 05/03/2018      ACCESSION NUMBER(S):  [id number]      ORDERING CLINICIAN:  EUGENIA MARC      TECHNIQUE:  Vascular ultrasound of the bilateral upper extremities was performed.  Evaluation was performed with grayscale, color, and spectral Doppler.  When possible, compression views of the evaluated veins was also  performed.      FINDINGS:  RIGHT UPPER EXTREMITY:  Evaluation of the visualized portions of the right internal jugular,  innominate, subclavian, axillary, brachial, cephalic, and basilic  veins was performed.      There is normal respiratory variation, normal compressibility,  "as  well as normal color doppler signal in the visualized vessels. There  is no evidence of thrombus.      LEFT UPPER EXTREMITY:  Evaluation of the visualized portions of the left internal jugular,  innominate, subclavian, axillary, brachial, cephalic, and basilic  veins was performed.      There is normal respiratory variation, normal compressibility, as  well as normal color doppler signal in the visualized vessels. There  is no evidence of thrombus.      Impression: No evidence of thrombosis within the visualized lower extremity veins.      I personally reviewed the images/study and I agree with the findings  as stated above by resident physician, Dr. Christ Montes.      MACRO:  None      Signed by: Yared Reilly 7/31/2024 3:39 AM  Dictation workstation:   [id number]     === 06/28/24 ===    XR ANKLE 3+ VIEWS RIGHT    - Impression -  1. No evidence of acute fracture or dislocation.    MACRO:  None.    Signed by: Bill Hinkle 6/28/2024 2:20 PM  Dictation workstation:   [id number]   === 08/07/23 ===    MR ANGIO ABDOMEN W AND WO IV CONTRAST MRCP    - Impression -  1. Borderline normal caliber CBD without intra or extrahepatic  biliary dilation. No obstructing cholelithiasis or mass is identified.  2. Hepatomegaly without focal liver lesion.        Assessment/Plan    58F with history of CIDP d/t Enbrel (2005) presents due to worsening ankle pain. She endorses b/l foot drop, recently obtained b/l ankle bracing. She has attempted to wear the hardware however she continues to have worsening ankle pain. Symptoms significantly improve while not wearing the ankle braces. She does not harbor any other signs and symptoms of underlying systemic rheumatologic disease. Her presentation is most consistent with mechanical issues. Referral to Physical medical and rehab provided.     With regard to her history of possible \"RA\", clinically she denies any morning stiffness, hand arthralgia. On exam, does not harbor any " evidence of active synovitis or joint deformities. At this time will not require any further testing.     Case discussed with Dr. Monreal,    Andi Schneider DO  Rheumatology Fellow, PGY-4      Plan, including risks and benefits, was discussed with the patient, informed on how to reach us.

## 2024-08-05 ENCOUNTER — APPOINTMENT (OUTPATIENT)
Dept: RHEUMATOLOGY | Facility: CLINIC | Age: 59
End: 2024-08-05
Payer: MEDICARE

## 2024-08-05 VITALS
BODY MASS INDEX: 24.59 KG/M2 | DIASTOLIC BLOOD PRESSURE: 96 MMHG | HEART RATE: 93 BPM | HEIGHT: 69 IN | SYSTOLIC BLOOD PRESSURE: 165 MMHG | TEMPERATURE: 98.7 F | WEIGHT: 166 LBS

## 2024-08-05 DIAGNOSIS — M79.604 BILATERAL LOWER EXTREMITY PAIN: Primary | ICD-10-CM

## 2024-08-05 DIAGNOSIS — M79.605 BILATERAL LOWER EXTREMITY PAIN: Primary | ICD-10-CM

## 2024-08-05 DIAGNOSIS — G61.81 CHRONIC INFLAMMATORY DEMYELINATING POLYNEUROPATHY (MULTI): ICD-10-CM

## 2024-08-05 PROCEDURE — 3008F BODY MASS INDEX DOCD: CPT

## 2024-08-05 PROCEDURE — 99204 OFFICE O/P NEW MOD 45 MIN: CPT

## 2024-08-05 PROCEDURE — 3077F SYST BP >= 140 MM HG: CPT

## 2024-08-05 PROCEDURE — 3080F DIAST BP >= 90 MM HG: CPT

## 2024-08-08 ENCOUNTER — CONSULT (OUTPATIENT)
Facility: HOSPITAL | Age: 59
End: 2024-08-08
Payer: MEDICARE

## 2024-08-08 VITALS — DIASTOLIC BLOOD PRESSURE: 79 MMHG | HEART RATE: 90 BPM | SYSTOLIC BLOOD PRESSURE: 143 MMHG | RESPIRATION RATE: 18 BRPM

## 2024-08-08 DIAGNOSIS — G61.81 CHRONIC INFLAMMATORY DEMYELINATING POLYNEUROPATHY (MULTI): Primary | ICD-10-CM

## 2024-08-08 DIAGNOSIS — M76.819 ANTERIOR TIBIALIS TENDINITIS, UNSPECIFIED LATERALITY: ICD-10-CM

## 2024-08-08 DIAGNOSIS — M21.371 FOOT DROP, BILATERAL: ICD-10-CM

## 2024-08-08 DIAGNOSIS — M21.372 FOOT DROP, BILATERAL: ICD-10-CM

## 2024-08-08 PROCEDURE — 99214 OFFICE O/P EST MOD 30 MIN: CPT | Performed by: PHYSICAL MEDICINE & REHABILITATION

## 2024-08-08 PROCEDURE — 99204 OFFICE O/P NEW MOD 45 MIN: CPT | Performed by: PHYSICAL MEDICINE & REHABILITATION

## 2024-08-08 ASSESSMENT — PAIN SCALES - GENERAL: PAINLEVEL: 5

## 2024-08-08 NOTE — PROGRESS NOTES
Physical Medicine and Rehabilitation [facility] Consult  08/08/24       Chief Complaint:  Low back pain     HPI:  La Donnelly is a  58 y.o. F who presents to the clinic today  for evaluation of low back pain.  Has pmh of CIDP since 2004.   Got AFOs at [facility] last few months, prior had solid afos from .  No knee buckling,. States hinged braces cause her too much ankle pain, needs more support.    Left foot is weaker and everts more.   Has significant ankle pain and anterior tib pain from walking, can't get used to them.    Imaging  Emg 2022; improvement compared to prior emgs on rle w respect to CIDP     Past Medical History:   Diagnosis Date    Asthma ([facility])     Chronic inflammatory demyelinating polyneuritis (Multi) 05/09/2016    CIDP (chronic inflammatory demyelinating polyneuropathy)    Encounter for general adult medical examination without abnormal findings 03/05/2018    Medicare annual wellness visit, subsequent    Encounter for screening for malignant neoplasm of colon 03/05/2018    Colon cancer screening    Encounter for screening for osteoporosis 03/05/2018    Osteoporosis screening    Other conditions influencing health status 06/18/2015    History of cough    Other conditions influencing health status     No significant past medical history    Personal history of other diseases of the musculoskeletal system and connective tissue 04/09/2019    History of neck pain        Past Surgical History:   Procedure Laterality Date    APPENDECTOMY  06/19/2015    Appendectomy    HYSTERECTOMY  06/19/2015    Supracervical Hysterectomy        Patient Active Problem List    Diagnosis Date Noted    Long-term current use of intravenous immunoglobulin (IVIG) 05/30/2024    Chronic obstructive pulmonary disease with acute exacerbation (Multi) 04/02/2024    Paraparesis (Multi) 01/02/2024    Arthralgia 01/02/2024    Overweight with body mass index (BMI) of 25 to 25.9 in adult 09/02/2023    Rib pain on right side  07/24/2023    Acquired equinovarus deformity of left foot 04/24/2023    Acquired equinovarus deformity of right foot 04/24/2023    Leg weakness 04/24/2023    Radicular pain in right arm 04/24/2023    Anxiety 04/18/2023    Asthma ([facility]) 04/18/2023    Bilateral foot pain 04/18/2023    Bronchitis, complicated 04/18/2023    Combined hyperlipidemia 04/18/2023    Depression, recurrent (CMS-HCC) 04/18/2023    Hypertension 04/18/2023    Increased serum lipids 04/18/2023    Osteopenia 04/18/2023    Rheumatoid arthritis (Multi) 04/18/2023    Vitamin D deficiency 04/18/2023    Abnormal CT scan of lung 04/18/2023    Cigarette nicotine dependence without complication 04/18/2023    Uncomplicated opioid dependence (Multi) 04/18/2023    Pneumonia 11/28/2022    Chronic obstructive lung disease (Multi) 06/21/2022    Cigarette smoker 06/21/2022    Abnormal computerized axial tomography of chest 06/21/2022    Asthma without status asthmaticus ([facility]) 06/21/2022    Cervical facet syndrome 09/06/2016    Myofascial muscle pain 10/27/2015    Chronic inflammatory demyelinating polyneuropathy (Multi) 10/27/2015    Cervicalgia 10/27/2015    Chronic pain 10/27/2015    Allergic rhinitis 04/10/2014        Family History   Problem Relation Name Age of Onset    Colon cancer Mother      Hypertension Mother      Other (CANCER OF NECK) Father      Hypertension Father      Colon cancer Other      Hypertension Other          Current Outpatient Medications   Medication Sig Dispense Refill    Advair Diskus 100-50 mcg/dose diskus inhaler Inhale 1 puff 2 times a day.      albuterol 0.63 mg/3 mL nebulizer solution Take 3 mL (0.63 mg) by nebulization see administration instructions. USE 1 UNIT DOSE IN NEBULIZER EVERY 4 TO 6 HOURS AS NEEDED.      albuterol 90 mcg/actuation inhaler Inhale 2 puffs 4 times a day.      cholecalciferol (Vitamin D-3) 50 MCG (2000 UT) tablet once daily.      cyclobenzaprine (Flexeril) 10 mg tablet Take 1 tablet (10 mg) by mouth  2 times a day as needed for muscle spasms. 180 tablet 1    diphenhydrAMINE (BENADryl) 25 mg capsule Take 1 capsule (25 mg) by mouth every 6 hours if needed.      EPINEPHrine (EpiPen 2-Kalyan) 0.3 mg/0.3 mL injection syringe Inject 0.3 mL (0.3 mg) as directed if needed.      fluticasone (Flonase) 50 mcg/actuation nasal spray Administer 2 sprays into each nostril once daily.      [START ON 8/19/2024] HYDROcodone-acetaminophen (Norco) 7.5-325 mg tablet Take 1 tablet by mouth every 6 hours if needed for severe pain (7 - 10). Do not fill before August 19, 2024. 120 tablet 0    [START ON 9/18/2024] HYDROcodone-acetaminophen (Norco) 7.5-325 mg tablet Take 1 tablet by mouth every 6 hours if needed for severe pain (7 - 10). Do not fill before September 18, 2024. 120 tablet 0    HYDROcodone-acetaminophen (Norco) 7.5-325 mg tablet Take 1 tablet by mouth every 6 hours if needed for severe pain (7 - 10). Do not fill before July 20, 2024. 120 tablet 0    ibuprofen 200 mg tablet Take 1 tablet (200 mg) by mouth every 6 hours if needed for mild pain (1 - 3).      immune globulin, human, (Hizentra) subcutaneous infusion Inject 20 mL (4,000 mg) under the skin 1 (one) time per week.      losartan-hydrochlorothiazide (Hyzaar) 100-25 mg tablet Take 1 tablet by mouth once daily. 90 tablet 3    naloxone (Narcan) 4 mg/0.1 mL nasal spray Administer 1 spray (4 mg) into affected nostril(s) if needed for opioid reversal for up to 1 dose. May repeat every 2-3 minutes if needed, alternating nostrils, until medical assistance becomes available. 1 each 0    predniSONE 2 mg tablet,delayed release (DR/EC) Take 2 mg by mouth once daily.      magnesium oxide 400 mg magnesium capsule Take 1 capsule (400 mg) by mouth.      predniSONE (Deltasone) 1 mg tablet Take 3 tablets (3 mg) by mouth once daily. (Patient not taking: Reported on 8/8/2024) 270 tablet 3    predniSONE (Deltasone) 20 mg tablet Take 3 tabs (60mg) daily for 3 days, then take 2 tabs (40mg)  daily for 3 days, then take 1 tab (20mg) daily for 3 days. (Patient not taking: Reported on 8/5/2024) 18 tablet 0     No current facility-administered medications for this visit.        Allergies   Allergen Reactions    Etanercept Other    Metoprolol Other     MUSCLE WEAKNESS    Nifedipine Wheezing        Social History     Socioeconomic History    Marital status:    Tobacco Use    Smoking status: Every Day     Current packs/day: 1.00     Average packs/day: 1 pack/day for 40.0 years (40.0 ttl pk-yrs)     Types: Cigarettes     Passive exposure: Never    Smokeless tobacco: Never    Tobacco comments:     Is trying to quit    Vaping Use    Vaping status: Never Used   Substance and Sexual Activity    Alcohol use: Never    Drug use: Never    Sexual activity: Defer        Review of Systems:  Constitutional:  Denies fever or chills, malaise, weight changes.   Eyes:  Denies change in visual acuity   HENT:  Denies nasal congestion or sore throat   Respiratory:  Denies cough or shortness of breath   Cardiovascular:  Denies chest pain or edema   GI:  Denies abdominal pain, nausea, vomiting, bloody stools or diarrhea   :  Denies dysuria   Integument:  Denies rash   Neurologic:  As per HPI  MSK: Per above HPI  Endocrine:  Denies polyuria or polydipsia   Lymphatic:  Denies swollen glands   Psychiatric:  Denies depression or anxiety            PHYSICAL EXAM:  /79 (BP Location: Left arm, Patient Position: Sitting)   Pulse 90   Resp 18   LMP  (LMP Unknown)     General:  NAD, well developed, f      Psychiatric: appropriate mood & affect.   Cardiovascular:  Normal pedal pulses; no LE edema.  Respiratory:  Normal rate; unlabored breathing.  Skin:  Intact; no erythema; no ecchymosis or rash.  Lymphatic:  No lymphadenopathy or lymphedema.  NEURO:  Alert and appropriate. Speech fluent, conversing appropriately.   Motor:    Rt: HF 5/5, KE 5/5, KF 5/5, DF 3/5, EHL 3/5, PF 4/5.    Lt: HF 5/5, KE 5/5, KF 5/5, DF 3/5, EHL 3/5,  PF 4/5.  Left eversion, 1/15  Gait: steppage wo braces  Decrease sensation bl feet          Impression: La Donnelly is a 58 y.o. F w pmh of CIDP  dx 2004 sp IVIG currently on Hizentra weekly treatments, presenting re her AFOs for foot drop years before she had solid afos that went al the way up the calf.       Plan:  Needs a solid AFO on the left for more support of ankle  Needs a longer AFO than she has now but hinged since she drives  Suspect some of the pain is related to anterior tibialis tendonitis due to less ankle support in a hinged AFO as was as dorsiflexion stop  Contacted therapy team who works w braces, will see her and eval and her gait as well as discuss above domi [employer] can consider second opinion domi [facility]       Thank you for the consultation.     Karol Jansen MD  Physical Medicine and Rehabilitation

## 2024-08-26 ENCOUNTER — CLINICAL SUPPORT (OUTPATIENT)
Dept: PHYSICAL THERAPY | Facility: HOSPITAL | Age: 59
End: 2024-08-26
Payer: MEDICARE

## 2024-08-26 DIAGNOSIS — R26.2 DIFFICULTY WALKING: Primary | ICD-10-CM

## 2024-08-26 DIAGNOSIS — M79.671 BILATERAL FOOT PAIN: ICD-10-CM

## 2024-08-26 DIAGNOSIS — M21.371 FOOT DROP, BILATERAL: ICD-10-CM

## 2024-08-26 DIAGNOSIS — M79.672 BILATERAL FOOT PAIN: ICD-10-CM

## 2024-08-26 DIAGNOSIS — G61.81 CHRONIC INFLAMMATORY DEMYELINATING POLYNEUROPATHY (MULTI): ICD-10-CM

## 2024-08-26 DIAGNOSIS — M21.372 FOOT DROP, BILATERAL: ICD-10-CM

## 2024-08-26 DIAGNOSIS — M76.819 ANTERIOR TIBIALIS TENDINITIS, UNSPECIFIED LATERALITY: ICD-10-CM

## 2024-08-26 PROCEDURE — 97110 THERAPEUTIC EXERCISES: CPT | Mod: GP | Performed by: PHYSICAL THERAPIST

## 2024-08-26 PROCEDURE — 97162 PT EVAL MOD COMPLEX 30 MIN: CPT | Mod: GP | Performed by: PHYSICAL THERAPIST

## 2024-08-26 ASSESSMENT — ENCOUNTER SYMPTOMS
DEPRESSION: 0
LOSS OF SENSATION IN FEET: 1
OCCASIONAL FEELINGS OF UNSTEADINESS: 0

## 2024-08-26 ASSESSMENT — PAIN - FUNCTIONAL ASSESSMENT: PAIN_FUNCTIONAL_ASSESSMENT: 0-10

## 2024-08-26 ASSESSMENT — PAIN SCALES - GENERAL: PAINLEVEL_OUTOF10: 4

## 2024-08-26 NOTE — PROGRESS NOTES
Physical Therapy    Physical Therapy Lower Extremity Evaluation    Patient Name: La Donnelly  MRN: [id number]  : 1965   Today's Date: 2024  Time Calculation  Start Time: 1350  Stop Time: 1450  Time Calculation (min): 60 min  PT Evaluation Time Entry  PT Evaluation (Moderate) Time Entry: 45  PT Therapeutic Procedures Time Entry  Therapeutic Exercise Time Entry: 15             Visit: 1  Auth: Requires auth after evaluation    Current Problem  Problem List Items Addressed This Visit             ICD-10-CM    Bilateral foot pain M79.671, M79.672    Relevant Orders    Follow Up In Physical Therapy    Chronic inflammatory demyelinating polyneuropathy (Multi) G61.81    Relevant Orders    Follow Up In Physical Therapy    Difficulty walking - Primary R26.2    Relevant Orders    Follow Up In Physical Therapy    Anterior tibialis tendinitis M76.819    Relevant Orders    Follow Up In Physical Therapy    Foot drop, bilateral M21.371, M21.372    Relevant Orders    Follow Up In Physical Therapy          SUBJECTIVE  Subjective     Ambulatory Screenings Summary     Screening  Frequency  Date Last Completed   Spiritual and Cultural Beliefs   Screening each visit or episode of care 2024   Falls Risk Screening every ambulatory visit [unfilled]   Pain Screening annually at primary care visit  2024   Domestic Violence screening annually at primary care visit 2024   Depression Screening annually in the primary care setting 2024   Suicide Risk Screening annually in the primary care setting 2024   Nutrition and Food Insecurity   Screening at least annually at primary care visit      Key Learner annually in the primary care setting 2024   Drug Screen   2024  2:28 PM   Alcohol Screen   2024  2:28 PM   Advance Directive         General  Name and  confirmed with patient on this date.  Reason for Referral: ankle pain, difficulty walking  Referred By: Dr. Mara Jansen  Past Medical  History Relevant to Rehab: demyelinating polyneuropathy    Precautions  Precautions  STEADI Fall Risk Score (The score of 4 or more indicates an increased risk of falling): 2  Precautions Comment: Low fall risk       Pain  Pain Assessment: 0-10  0-10 (Numeric) Pain Score: 4 (ranges up to 10/10 with activity)  Pain Location: Foot  Pain Orientation: Left, Right  Pain Descriptors: Burning, Aching, Pressure, Numbness    SUBJECTIVE:   Chief complaint:  Patient reports she was on her feet a lot in June and started having pain in both ankles and feet. She had AFOs prior, and got new ones made because her left ankle was rollign out and rubbing into the side of the brace. She was fitted with a different style of AFO (lower height hinge), and she has never been able to wear them without experiencing increased pain in the feet and ankles. She notes that the left foot is weaker than the right.     Patient is walking without braces currently because braces hurt too badly, but this limits her ability to perform household activities, general mobility, outdoor/yard work, or grocery shopping.     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  Current limitations: standing and walking, house and yard work  Home setup: 1 story with basement (laundry)  Work: disability  Patients goal: improve mobility    OBJECTIVE:    Lower Extremity Strength: (5/5 unless noted)  MMT RIGHT LEFT   Hip Flexion 4+ 4   Hip Extension 4+ 4+   Hip Abduction 4+ 4+   Hip Adduction 5 5   Knee Extension 5 4+   Knee Flexion 5 5   Ankle DF 4+ 4   Ankle PF 5 5   Ankle INV 4+ 4+   Ankle EV 4- 3+     Lower Extremity ROM: WNL unless documented below:  ROM in Degrees  RIGHT LEFT   Knee Extension -2 -5   Knee Flexion WFL WFL   Ankle DF 8 6   Ankle PF (sitting) 20 20   Ankle INV WFL (hyper) WFL (hyper)   Ankle EV -5 -8     Joint mobility: ankle eversion limited, tone in feet  create high arch and reduced mobility    Gait mechanics: Patient demonstrates left more than right foot inversion and supination with deficits in knee extension in stance and shortened stride length. Ambulation with hinged AFOs results in improved (reduced) inversion and feet staying more straight forward rather than adducting. Patient did not note significant change in symptoms with ~80 feet of ambulation in clinic    Neurological symptoms: (-) clonus, (-) babinski    Functional Outcome:   Other Measures  Activities - Specific Balance Confidence Scale: 1160 (72.5% confidence)    TREATMENT:  Access Code: [email or web address]  URL: [email or web address]  Date: 08/26/2024  Prepared by: Bora Morales    Program Notes  Orange band - upgrade to blue when able    Exercises  - Seated Ankle Eversion with Resistance  - 1 x daily - 2 sets - 10 reps -   hold  - Seated Ankle Dorsiflexion with Anchored Resistance  - 1 x daily - 2 sets - 10 reps -   hold  - Seated Long Arc Quad  - 1 x daily - 2 sets - 10 reps  - Seated Ankle Inversion Eversion PROM  - 2 x daily - 10 reps - 3 sec hold  - 3 way calf stretch with towel  - 2 x daily - 5 reps - 20 sec hold    ASSESSEMENT  Pt is a 59 y.o. referred to physical therapy for a dx of difficulty walking and ankle pain due to chronic inflammatory demyelinating polyneuropathy by Clifford Dasilva* . Pt presents with ankle and feet pain, reduced motion in the ankles, reduced strength in the LE's, difficulty using AFOs for ambulation, and reduced functional capabilities. This pt would benefit from a therapy program to restore prior level of function, reduce pain, increase AROM, increase strength, and improve gait and balance.     The physical therapy prognosis is good for the patient to achieve their goals.   The pt tolerated therapy treatment today well with no adverse effects.  Barriers to therapy include:  none    PLAN  The pt will be seen 1 days a week for 4 weeks.       The pt has been educated about the risks and benefits of physical therapy and gives consent for treatment.     The patient will benefit from physical therapy treatment to include: Treatment/Interventions: Education/ Instruction, Therapeutic exercises, Therapeutic activities, Self care/ home management, Orthosis fit/ training, Neuromuscular re-education, Manual therapy, Gait training, Electrical stimulation, Cryotherapy    Goals:  Active       PT Problem       PT Goal 1       Start:  08/26/24    Expected End:  09/09/24       Patient reports reduced pain to </= 3/10 with ambulating with AFOs up to 10-15 minutes           PT Goal 2       Start:  08/26/24    Expected End:  09/23/24       Independent home exercise program with 90% teach back capability by the patient           PT Goal 3       Start:  08/26/24    Expected End:  09/23/24       Improve MMT of LE deficits to 4/5 or better to improve joint stability with ambulation           PT Goal 4       Start:  08/26/24    Expected End:  09/23/24       Functional Outcome ABC score improves to >/= 1300 (initially 1160)           Patient Stated Goal 1       Start:  08/26/24    Expected End:  09/23/24       Able to walk in home and yard or while grocery shopping up to 30 minutes without increased pain

## 2024-09-20 ENCOUNTER — TREATMENT (OUTPATIENT)
Dept: PHYSICAL THERAPY | Facility: HOSPITAL | Age: 59
End: 2024-09-20
Payer: MEDICARE

## 2024-09-20 DIAGNOSIS — M79.672 BILATERAL FOOT PAIN: ICD-10-CM

## 2024-09-20 DIAGNOSIS — M21.371 FOOT DROP, BILATERAL: ICD-10-CM

## 2024-09-20 DIAGNOSIS — M76.819 ANTERIOR TIBIALIS TENDINITIS, UNSPECIFIED LATERALITY: ICD-10-CM

## 2024-09-20 DIAGNOSIS — M79.671 BILATERAL FOOT PAIN: ICD-10-CM

## 2024-09-20 DIAGNOSIS — G61.81 CHRONIC INFLAMMATORY DEMYELINATING POLYNEUROPATHY (MULTI): ICD-10-CM

## 2024-09-20 DIAGNOSIS — M21.372 FOOT DROP, BILATERAL: ICD-10-CM

## 2024-09-20 DIAGNOSIS — R26.2 DIFFICULTY WALKING: Primary | ICD-10-CM

## 2024-09-20 PROCEDURE — 97140 MANUAL THERAPY 1/> REGIONS: CPT | Mod: GP | Performed by: PHYSICAL THERAPIST

## 2024-09-20 ASSESSMENT — PAIN SCALES - GENERAL: PAINLEVEL_OUTOF10: 3

## 2024-09-20 NOTE — PROGRESS NOTES
Physical Therapy    Physical Therapy Treatment    Patient Name: La Donnelly  MRN: [id number]  : 1965   Today's Date: 2024  Time Calculation  Start Time: 1305  Stop Time: 1335  Time Calculation (min): 30 min  PT Therapeutic Procedures Time Entry  Manual Therapy Time Entry: 25  Therapeutic Exercise Time Entry: 5           Visit Number: 2  Auth: 12 visits    Current Problem  Problem List Items Addressed This Visit             ICD-10-CM    Bilateral foot pain M79.671, M79.672    Chronic inflammatory demyelinating polyneuropathy (Multi) G61.81    Difficulty walking - Primary R26.2    Anterior tibialis tendinitis M76.819    Foot drop, bilateral M21.371, M21.372        Subjective   Patient reports that she continues to have difficulty walking for any significant length of time (>10-15 min) and has a slowed walk. She has not attempted to wear her braces because she is convinced if they were taller and supported the back of her leg better she wouldn't have the problems she is. She notes that the ankles get weak easily and then she gets pain in the ankles and feet.      Precautions  Precautions  STEADI Fall Risk Score (The score of 4 or more indicates an increased risk of falling): 2  Precautions Comment: Low fall risk    Pain  0-10 (Numeric) Pain Score: 3  Pain Location: Ankle  Pain Orientation: Left, Right  Pain Descriptors: Burning, Aching, Pressure, Numbness    Objective   Gait pattern: knee valgus left more than right, left ankle supination (severe), favoring of left LE (abbreviated stance phase).     Treatments:  EXERCISES      Date 2024           VISIT # #2 # #    REPS REPS REPS         Nustep      Bike            STM 10'     Ankle and Foot Mobilizations 5'     Ankle ROM/Stretching 10'                                                                                                           Sciatic NRS/Flossing Added to HEP           HEP Reviewed from kristin Giron Sciatic NRS       Program Notes from  eval:  Orange band - upgrade to blue when able    Exercises  - Seated Ankle Eversion with Resistance  - 1 x daily - 2 sets - 10 reps -   hold  - Seated Ankle Dorsiflexion with Anchored Resistance  - 1 x daily - 2 sets - 10 reps -   hold  - Seated Long Arc Quad  - 1 x daily - 2 sets - 10 reps  - Seated Ankle Inversion Eversion PROM  - 2 x daily - 10 reps - 3 sec hold  - 3 way calf stretch with towel  - 2 x daily - 5 reps - 20 sec hold    Assessment:   Patient reports 3/10 pain after treatment today. Patient noted that her walk didn't feel any different after treatment.     Plan:  Patient to see Shen for problem solving with AFOs for ease of walking greater distances without ankle and foot pain. Patient will bring shoes and AFOs with her.    OP PT Plan  Treatment/Interventions: Education/ Instruction, Therapeutic exercises, Therapeutic activities, Self care/ home management, Orthosis fit/ training, Neuromuscular re-education, Manual therapy, Gait training, Electrical stimulation, Cryotherapy  PT Plan: Skilled PT  PT Frequency: 1 time per week  Duration: 4 weeks  Certification Period Start Date:  (Requires Auth)  Number of Treatments Authorized: Requires auth  Rehab Potential: Excellent  Plan of Care Agreement: Patient    Goals:  Active       PT Problem       PT Goal 1       Start:  08/26/24    Expected End:  09/09/24       Patient reports reduced pain to </= 3/10 with ambulating with AFOs up to 10-15 minutes           PT Goal 2       Start:  08/26/24    Expected End:  09/23/24       Independent home exercise program with 90% teach back capability by the patient           PT Goal 3       Start:  08/26/24    Expected End:  09/23/24       Improve MMT of LE deficits to 4/5 or better to improve joint stability with ambulation           PT Goal 4       Start:  08/26/24    Expected End:  09/23/24       Functional Outcome ABC score improves to >/= 1300 (initially 1160)           Patient Stated Goal 1       Start:  08/26/24     Expected End:  09/23/24       Able to walk in home and yard or while grocery shopping up to 30 minutes without increased pain

## 2024-10-01 ENCOUNTER — TREATMENT (OUTPATIENT)
Dept: PHYSICAL THERAPY | Facility: HOSPITAL | Age: 59
End: 2024-10-01
Payer: MEDICARE

## 2024-10-01 DIAGNOSIS — M76.819 ANTERIOR TIBIALIS TENDINITIS, UNSPECIFIED LATERALITY: ICD-10-CM

## 2024-10-01 DIAGNOSIS — M79.672 BILATERAL FOOT PAIN: ICD-10-CM

## 2024-10-01 DIAGNOSIS — M79.671 BILATERAL FOOT PAIN: ICD-10-CM

## 2024-10-01 DIAGNOSIS — M21.372 FOOT DROP, BILATERAL: ICD-10-CM

## 2024-10-01 DIAGNOSIS — G61.81 CHRONIC INFLAMMATORY DEMYELINATING POLYNEUROPATHY (MULTI): ICD-10-CM

## 2024-10-01 DIAGNOSIS — R26.2 DIFFICULTY WALKING: ICD-10-CM

## 2024-10-01 DIAGNOSIS — M21.371 FOOT DROP, BILATERAL: ICD-10-CM

## 2024-10-01 PROCEDURE — 97110 THERAPEUTIC EXERCISES: CPT | Mod: GP,CQ

## 2024-10-01 ASSESSMENT — PAIN - FUNCTIONAL ASSESSMENT: PAIN_FUNCTIONAL_ASSESSMENT: 0-10

## 2024-10-01 ASSESSMENT — PAIN DESCRIPTION - DESCRIPTORS: DESCRIPTORS: ACHING

## 2024-10-01 ASSESSMENT — PAIN SCALES - GENERAL: PAINLEVEL_OUTOF10: 3

## 2024-10-01 NOTE — PROGRESS NOTES
Physical Therapy    Physical Therapy Treatment    Patient Name: La Donnelly  MRN: [id number]  : 1965  Today's Date: 10/1/2024  Time Calculation  Start Time: 845  Stop Time: 930  Time Calculation (min): 45 min    PT Therapeutic Procedures Time Entry  Therapeutic Exercise Time Entry: 45          VISIT:# 3    Current Problem  Problem List Items Addressed This Visit             ICD-10-CM    Bilateral foot pain M79.671, M79.672    Chronic inflammatory demyelinating polyneuropathy (Multi) G61.81    Difficulty walking R26.2    Anterior tibialis tendinitis M76.819    Foot drop, bilateral M21.371, M21.372        Subjective   No falls reported since last visit. Pt did bring her AFO's     Precautions  Precautions  STEADI Fall Risk Score (The score of 4 or more indicates an increased risk of falling): 2  Precautions Comment: Low fall risk       Pain  Pain Assessment: 0-10  0-10 (Numeric) Pain Score: 3  Pain Location: Foot  Pain Orientation: Left, Right  Pain Descriptors: Aching       Objective       Access Code: [email or web address]  URL: [email or web address]  Date: 10/01/2024  Prepared by: Shen Clark    Exercises  - Long Sitting Ankle Eversion with Resistance  - 1-2 x daily - 5-7 x weekly - 2 sets - 10 reps  - Seated Ankle Plantarflexion with Resistance  - 1-2 x daily - 5-7 x weekly - 2 sets - 10 reps  - Seated Ankle Dorsiflexion with Resistance  - 1-2 x daily - 5-7 x weekly - 2 sets - 10 reps  - Seated Ankle Alphabet  - 1-2 x daily - 5-7 x weekly - 1 sets - 10 reps        Treatments:  EXERCISES      Date 2024 10/1/2024           VISIT # #2 #3 #    REPS REPS REPS         Nustep      Bike            STM 10'     Ankle and Foot Mobilizations 5'     Ankle ROM/Stretching 10'           Ankle PF/DF/EV   2 x 10 Kumar RTB                              Gait w/ w/o AFO's           Sciatic NRS/Flossing Added to HEP           HEP Reviewed from Bree, kristin Sciatic NRS Access Code: [email or web address]         Assessment: POC was discussed with PT Torsten.  Spoke with Juan at [facility] about AFO modifications.  He will should be able to change the AFO's joints to decrease the amount of DF pt is getting for improved comfort.   Pt's L foot is supinating with gait.  Juan may be able to post her laterally to help control this motion.  Pt was given information on shoes that are recommend to wear with her AFO's as some have stability bars incorporated.  Pt will try to wear her AFO's a few hours in the morning and afternoon. Communicated with Dr. Ward about AFO modifications.      Outpatient Education  Individual(s) Educated: Patient  Education Provided: Home Exercise Program  Equipment: Thera-Band  Risk and Benefits Discussed with Patient/Caregiver/Other: yes  Patient/Caregiver Demonstrated Understanding: yes  Plan of Care Discussed and Agreed Upon: yes  Patient Response to Education: Patient/Caregiver Verbalized Understanding of Information, Patient/Caregiver Performed Return Demonstration of Exercises/Activities, Patient/Caregiver Asked Appropriate Questions  Education Comment: Access Code: [email or web address]    Plan: Cont to increase strength and work on improving comfort with use of AFO's.   OP PT Plan  Treatment/Interventions: Education/ Instruction, Therapeutic exercises, Therapeutic activities, Self care/ home management, Orthosis fit/ training, Neuromuscular re-education, Manual therapy, Gait training, Electrical stimulation, Cryotherapy  PT Plan: Skilled PT  PT Frequency: 1 time per week  Duration: 4 weeks  Certification Period Start Date:  (Requires Auth)  Number of Treatments Authorized: Requires auth  Rehab Potential: Excellent  Plan of Care Agreement: Patient    Goals:  Active       PT Problem       PT Goal 1       Start:  08/26/24    Expected End:  09/09/24       Patient reports reduced pain to </= 3/10 with ambulating with AFOs up to 10-15 minutes           PT Goal 2       Start:  08/26/24    Expected End:  09/23/24        Independent home exercise program with 90% teach back capability by the patient           PT Goal 3       Start:  08/26/24    Expected End:  09/23/24       Improve MMT of LE deficits to 4/5 or better to improve joint stability with ambulation           PT Goal 4       Start:  08/26/24    Expected End:  09/23/24       Functional Outcome ABC score improves to >/= 1300 (initially 1160)           Patient Stated Goal 1       Start:  08/26/24    Expected End:  09/23/24       Able to walk in home and yard or while grocery shopping up to 30 minutes without increased pain

## 2024-10-08 ENCOUNTER — TREATMENT (OUTPATIENT)
Dept: PHYSICAL THERAPY | Facility: HOSPITAL | Age: 59
End: 2024-10-08
Payer: MEDICARE

## 2024-10-08 DIAGNOSIS — M21.372 FOOT DROP, BILATERAL: ICD-10-CM

## 2024-10-08 DIAGNOSIS — M79.671 BILATERAL FOOT PAIN: ICD-10-CM

## 2024-10-08 DIAGNOSIS — R26.2 DIFFICULTY WALKING: ICD-10-CM

## 2024-10-08 DIAGNOSIS — M79.672 BILATERAL FOOT PAIN: ICD-10-CM

## 2024-10-08 DIAGNOSIS — G61.81 CHRONIC INFLAMMATORY DEMYELINATING POLYNEUROPATHY (MULTI): ICD-10-CM

## 2024-10-08 DIAGNOSIS — M21.371 FOOT DROP, BILATERAL: ICD-10-CM

## 2024-10-08 DIAGNOSIS — M76.819 ANTERIOR TIBIALIS TENDINITIS, UNSPECIFIED LATERALITY: ICD-10-CM

## 2024-10-08 PROCEDURE — 97110 THERAPEUTIC EXERCISES: CPT | Mod: GP,CQ

## 2024-10-08 ASSESSMENT — PAIN SCALES - GENERAL: PAINLEVEL_OUTOF10: 3

## 2024-10-08 ASSESSMENT — PAIN - FUNCTIONAL ASSESSMENT: PAIN_FUNCTIONAL_ASSESSMENT: 0-10

## 2024-10-08 NOTE — PROGRESS NOTES
"Physical Therapy    Physical Therapy Treatment    Patient Name: La Donnelly  MRN: [id number]  : 1965  Today's Date: 10/8/2024  Time Calculation  Start Time: 440  Stop Time: 525  Time Calculation (min): 45 min    PT Therapeutic Procedures Time Entry  Therapeutic Exercise Time Entry: 45          VISIT:# 4    Current Problem  Problem List Items Addressed This Visit             ICD-10-CM    Bilateral foot pain M79.671, M79.672    Chronic inflammatory demyelinating polyneuropathy (Multi) G61.81    Difficulty walking R26.2    Anterior tibialis tendinitis M76.819    Foot drop, bilateral M21.371, M21.372        Subjective   No falls reported since last visit. Patient is independent with current HEP.  Going to [facility] Thursday 10/10/24 @4pm     Precautions  Precautions  STEADI Fall Risk Score (The score of 4 or more indicates an increased risk of falling): 2  Braces Applied: not today  Prosthesis/Orthosis Used: Ankle/Foot orthosis  Precautions Comment: Low fall risk       Pain  Pain Assessment: 0-10  0-10 (Numeric) Pain Score: 3  Pain Location: Foot  Pain Orientation: Left, Right       Objective    Added new exercises on this date           Treatments:  EXERCISES      Date 2024 10/1/2024  10/8/2024          VISIT # #2 #3 #4    REPS REPS REPS         Nustep   12' @ L1   Bike            Shuttle: dlp  Shuttle: slp  Shuttle: TR   4B 20x 5B 20x  L 3b (10x 2) R 4b  2B 2 x 10                STM 10'     Ankle and Foot Mobilizations 5'     Ankle ROM/Stretching 10'           Ankle PF/DF/EV   2 x 10 Kumar RTB    RB a/p  RB m/l  RB Stretch   2 x 10   2 x 10  30\" x 3         Clamshells   2 x 10            Gait w/ w/o AFO's  Hold until adjusted         Sciatic NRS/Flossing Added to HEP           HEP Reviewed from kristin Giron Sciatic NRS Access Code: [email or web address]        Assessment:   Pt tolerated treatment without complaint of increase in symptoms.  Pt required minimal cues with new exercises.         Plan: Cont to improve " strength as able.  Await AFO adjustments.   OP PT Plan  Treatment/Interventions: Education/ Instruction, Therapeutic exercises, Therapeutic activities, Self care/ home management, Orthosis fit/ training, Neuromuscular re-education, Manual therapy, Gait training, Electrical stimulation, Cryotherapy  PT Plan: Skilled PT  PT Frequency: 1 time per week  Duration: 4 weeks  Certification Period Start Date:  (Requires Auth)  Number of Treatments Authorized: Requires auth  Rehab Potential: Excellent  Plan of Care Agreement: Patient    Goals:  Active       PT Problem       PT Goal 1       Start:  08/26/24    Expected End:  09/09/24       Patient reports reduced pain to </= 3/10 with ambulating with AFOs up to 10-15 minutes           PT Goal 2       Start:  08/26/24    Expected End:  09/23/24       Independent home exercise program with 90% teach back capability by the patient           PT Goal 3       Start:  08/26/24    Expected End:  09/23/24       Improve MMT of LE deficits to 4/5 or better to improve joint stability with ambulation           PT Goal 4       Start:  08/26/24    Expected End:  09/23/24       Functional Outcome ABC score improves to >/= 1300 (initially 1160)           Patient Stated Goal 1       Start:  08/26/24    Expected End:  09/23/24       Able to walk in home and yard or while grocery shopping up to 30 minutes without increased pain

## 2024-10-09 ENCOUNTER — APPOINTMENT (OUTPATIENT)
Dept: PHYSICAL THERAPY | Facility: HOSPITAL | Age: 59
End: 2024-10-09
Payer: MEDICARE

## 2024-10-14 ENCOUNTER — APPOINTMENT (OUTPATIENT)
Dept: PRIMARY CARE | Facility: CLINIC | Age: 59
End: 2024-10-14
Payer: MEDICARE

## 2024-10-14 VITALS
OXYGEN SATURATION: 98 % | BODY MASS INDEX: 25.18 KG/M2 | SYSTOLIC BLOOD PRESSURE: 128 MMHG | RESPIRATION RATE: 16 BRPM | HEART RATE: 88 BPM | HEIGHT: 69 IN | WEIGHT: 170 LBS | DIASTOLIC BLOOD PRESSURE: 78 MMHG

## 2024-10-14 DIAGNOSIS — M79.672 BILATERAL FOOT PAIN: ICD-10-CM

## 2024-10-14 DIAGNOSIS — Z12.31 VISIT FOR SCREENING MAMMOGRAM: ICD-10-CM

## 2024-10-14 DIAGNOSIS — Z23 NEEDS FLU SHOT: Primary | ICD-10-CM

## 2024-10-14 DIAGNOSIS — M25.50 ARTHRALGIA, UNSPECIFIED JOINT: ICD-10-CM

## 2024-10-14 DIAGNOSIS — G89.29 OTHER CHRONIC PAIN: Chronic | ICD-10-CM

## 2024-10-14 DIAGNOSIS — Z12.11 SCREENING FOR COLON CANCER: ICD-10-CM

## 2024-10-14 DIAGNOSIS — M79.671 BILATERAL FOOT PAIN: ICD-10-CM

## 2024-10-14 DIAGNOSIS — R29.898 WEAKNESS OF BOTH LOWER EXTREMITIES: ICD-10-CM

## 2024-10-14 PROBLEM — M54.50 LOW BACK PAIN, UNSPECIFIED: Status: ACTIVE | Noted: 2023-07-29

## 2024-10-14 PROBLEM — J40 BRONCHITIS: Status: ACTIVE | Noted: 2024-10-14

## 2024-10-14 PROBLEM — M79.673 PAIN OF FOOT: Status: ACTIVE | Noted: 2024-10-14

## 2024-10-14 PROBLEM — E78.2 MIXED HYPERLIPIDEMIA: Status: ACTIVE | Noted: 2024-10-14

## 2024-10-14 PROBLEM — J44.9 CHRONIC OBSTRUCTIVE PULMONARY DISEASE (MULTI): Status: ACTIVE | Noted: 2022-06-21

## 2024-10-14 PROBLEM — F32.A DEPRESSIVE DISORDER: Status: ACTIVE | Noted: 2024-10-14

## 2024-10-14 PROBLEM — E66.3 OVERWEIGHT WITH BODY MASS INDEX (BMI) 25.0-29.9: Status: ACTIVE | Noted: 2024-10-14

## 2024-10-14 PROBLEM — M21.549 ACQUIRED EQUINOVARUS DEFORMITY: Status: ACTIVE | Noted: 2024-10-14

## 2024-10-14 PROBLEM — G61.81 CHRONIC INFLAMMATORY DEMYELINATING POLYRADICULONEUROPATHY (MULTI): Status: ACTIVE | Noted: 2024-10-14

## 2024-10-14 PROBLEM — J45.40 MODERATE PERSISTENT ASTHMA: Status: ACTIVE | Noted: 2023-06-20

## 2024-10-14 PROBLEM — M62.81 MUSCLE WEAKNESS OF EXTREMITY: Status: ACTIVE | Noted: 2024-10-14

## 2024-10-14 PROBLEM — M54.10 RADICULAR PAIN: Status: ACTIVE | Noted: 2024-10-14

## 2024-10-14 PROBLEM — R91.8 MULTIPLE NODULES OF LUNG: Status: ACTIVE | Noted: 2023-06-20

## 2024-10-14 PROCEDURE — 4004F PT TOBACCO SCREEN RCVD TLK: CPT | Performed by: FAMILY MEDICINE

## 2024-10-14 PROCEDURE — G0008 ADMIN INFLUENZA VIRUS VAC: HCPCS | Performed by: FAMILY MEDICINE

## 2024-10-14 PROCEDURE — 3078F DIAST BP <80 MM HG: CPT | Performed by: FAMILY MEDICINE

## 2024-10-14 PROCEDURE — 3008F BODY MASS INDEX DOCD: CPT | Performed by: FAMILY MEDICINE

## 2024-10-14 PROCEDURE — 90656 IIV3 VACC NO PRSV 0.5 ML IM: CPT | Performed by: FAMILY MEDICINE

## 2024-10-14 PROCEDURE — 3074F SYST BP LT 130 MM HG: CPT | Performed by: FAMILY MEDICINE

## 2024-10-14 PROCEDURE — 99214 OFFICE O/P EST MOD 30 MIN: CPT | Performed by: FAMILY MEDICINE

## 2024-10-14 RX ORDER — HYDROCODONE BITARTRATE AND ACETAMINOPHEN 7.5; 325 MG/1; MG/1
1 TABLET ORAL EVERY 6 HOURS PRN
Qty: 120 TABLET | Refills: 0 | Status: SHIPPED | OUTPATIENT
Start: 2024-11-17 | End: 2024-12-17

## 2024-10-14 RX ORDER — HYDROCODONE BITARTRATE AND ACETAMINOPHEN 7.5; 325 MG/1; MG/1
1 TABLET ORAL EVERY 6 HOURS PRN
Qty: 120 TABLET | Refills: 0 | Status: SHIPPED | OUTPATIENT
Start: 2024-10-18 | End: 2024-11-17

## 2024-10-14 RX ORDER — HYDROCODONE BITARTRATE AND ACETAMINOPHEN 7.5; 325 MG/1; MG/1
1 TABLET ORAL EVERY 6 HOURS PRN
Qty: 120 TABLET | Refills: 0 | Status: SHIPPED | OUTPATIENT
Start: 2024-12-17 | End: 2025-01-16

## 2024-10-14 ASSESSMENT — ENCOUNTER SYMPTOMS
DEPRESSION: 0
LOSS OF SENSATION IN FEET: 0
OCCASIONAL FEELINGS OF UNSTEADINESS: 0
ARTHRALGIAS: 1
WHEEZING: 1

## 2024-10-14 ASSESSMENT — ANXIETY QUESTIONNAIRES
4. TROUBLE RELAXING: NOT AT ALL
1. FEELING NERVOUS, ANXIOUS, OR ON EDGE: NOT AT ALL
7. FEELING AFRAID AS IF SOMETHING AWFUL MIGHT HAPPEN: NOT AT ALL
3. WORRYING TOO MUCH ABOUT DIFFERENT THINGS: NOT AT ALL
GAD7 TOTAL SCORE: 0
6. BECOMING EASILY ANNOYED OR IRRITABLE: NOT AT ALL
2. NOT BEING ABLE TO STOP OR CONTROL WORRYING: NOT AT ALL
5. BEING SO RESTLESS THAT IT IS HARD TO SIT STILL: NOT AT ALL
IF YOU CHECKED OFF ANY PROBLEMS ON THIS QUESTIONNAIRE, HOW DIFFICULT HAVE THESE PROBLEMS MADE IT FOR YOU TO DO YOUR WORK, TAKE CARE OF THINGS AT HOME, OR GET ALONG WITH OTHER PEOPLE: NOT DIFFICULT AT ALL

## 2024-10-14 ASSESSMENT — PATIENT HEALTH QUESTIONNAIRE - PHQ9
2. FEELING DOWN, DEPRESSED OR HOPELESS: NOT AT ALL
1. LITTLE INTEREST OR PLEASURE IN DOING THINGS: NOT AT ALL
SUM OF ALL RESPONSES TO PHQ9 QUESTIONS 1 AND 2: 0
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
## 2024-10-15 ENCOUNTER — TREATMENT (OUTPATIENT)
Dept: PHYSICAL THERAPY | Facility: HOSPITAL | Age: 59
End: 2024-10-15
Payer: MEDICARE

## 2024-10-15 DIAGNOSIS — M21.371 FOOT DROP, BILATERAL: ICD-10-CM

## 2024-10-15 DIAGNOSIS — G61.81 CHRONIC INFLAMMATORY DEMYELINATING POLYNEUROPATHY (MULTI): ICD-10-CM

## 2024-10-15 DIAGNOSIS — M76.819 ANTERIOR TIBIALIS TENDINITIS, UNSPECIFIED LATERALITY: ICD-10-CM

## 2024-10-15 DIAGNOSIS — M79.672 BILATERAL FOOT PAIN: ICD-10-CM

## 2024-10-15 DIAGNOSIS — M79.671 BILATERAL FOOT PAIN: ICD-10-CM

## 2024-10-15 DIAGNOSIS — R26.2 DIFFICULTY WALKING: ICD-10-CM

## 2024-10-15 DIAGNOSIS — M21.372 FOOT DROP, BILATERAL: ICD-10-CM

## 2024-10-15 PROCEDURE — 97110 THERAPEUTIC EXERCISES: CPT | Mod: GP,CQ

## 2024-10-15 ASSESSMENT — PAIN SCALES - GENERAL: PAINLEVEL_OUTOF10: 3

## 2024-10-15 ASSESSMENT — PAIN - FUNCTIONAL ASSESSMENT: PAIN_FUNCTIONAL_ASSESSMENT: 0-10

## 2024-10-15 NOTE — PROGRESS NOTES
"Physical Therapy    Physical Therapy Treatment    Patient Name: La Donnelly  MRN: [id number]  : 1965  Today's Date: 10/15/2024  Time Calculation  Start Time: 845  Stop Time: 926  Time Calculation (min): 41 min    PT Therapeutic Procedures Time Entry  Therapeutic Exercise Time Entry: 41          VISIT:#     Current Problem  Problem List Items Addressed This Visit             ICD-10-CM    Bilateral foot pain M79.671, M79.672    Chronic inflammatory demyelinating polyneuropathy (Multi) G61.81    Difficulty walking R26.2    Anterior tibialis tendinitis M76.819    Foot drop, bilateral M21.371, M21.372        Subjective   No falls reported since last visit. Patient is independent with current HEP.  Pt saw Juan at [facility] and he made adjustments and Pt arrives with Guido AFO's on.      Precautions  Precautions  STEADI Fall Risk Score (The score of 4 or more indicates an increased risk of falling): 2  Braces Applied: yes  Prosthesis/Orthosis Used: Ankle/Foot orthosis  Precautions Comment: Low fall risk       Pain  Pain Assessment: 0-10  0-10 (Numeric) Pain Score: 3       Objective    Added Q hip   Increased shuttle resistance  Assessed gait pattern with guido AFO's             Treatments:  EXERCISES       Date 2024 10/1/2024  10/8/2024  10/15/2024           VISIT # #2 #3 #4 #5    REPS REPS REPS           Nustep   12' @ L1 10' @L3   Bike              Shuttle: dlp  Shuttle: slp  Shuttle: TR   4B 20x 5B 20x  L 3b (10x 2) R 4b  2B 2 x 10  5B 2 x 20   R 5b (2 x 10) L 4b   2B 2 x 10           Qhip Guido LE  Flexion  Abduction       10# 2 x 10  10# 2 x 10                  STM 10'      Ankle and Foot Mobilizations 5'      Ankle ROM/Stretching 10'             Ankle PF/DF/EV   2 x 10 Guido RTB     RB a/p  RB m/l  RB Stretch   2 x 10   2 x 10  30\" x 3     30\" x 3          Clamshells   2 x 10              Gait w/ w/o AFO's  Hold until adjusted           Sciatic NRS/Flossing Added to HEP             HEP Reviewed from Bree, " added Sciatic NRS Access Code: [email or web address]  Gait assess w/ guido AFO       Assessment:   Pt has improved gait pattern with guido AFO's. Pt reports no pain at this time with wearing them (other than her normal pain).  Pt has slight L foot toe in noted but good DF and decreased lateral roll out.  Pt has new New [employer] shoes that has improved her stability.  Pt was able to perform Q-hip strengthening exercises.   POC was discussed with PT Torsten.        Plan: Recheck soon   OP PT Plan  Treatment/Interventions: Education/ Instruction, Therapeutic exercises, Therapeutic activities, Self care/ home management, Orthosis fit/ training, Neuromuscular re-education, Manual therapy, Gait training, Electrical stimulation, Cryotherapy  PT Plan: Skilled PT  PT Frequency: 1 time per week  Duration: 4 weeks  Certification Period Start Date:  (Requires Auth)  Number of Treatments Authorized: Requires auth  Rehab Potential: Excellent  Plan of Care Agreement: Patient    Goals:  Active       PT Problem       PT Goal 1       Start:  08/26/24    Expected End:  09/09/24       Patient reports reduced pain to </= 3/10 with ambulating with AFOs up to 10-15 minutes           PT Goal 2       Start:  08/26/24    Expected End:  09/23/24       Independent home exercise program with 90% teach back capability by the patient           PT Goal 3       Start:  08/26/24    Expected End:  09/23/24       Improve MMT of LE deficits to 4/5 or better to improve joint stability with ambulation           PT Goal 4       Start:  08/26/24    Expected End:  09/23/24       Functional Outcome ABC score improves to >/= 1300 (initially 1160)           Patient Stated Goal 1       Start:  08/26/24    Expected End:  09/23/24       Able to walk in home and yard or while grocery shopping up to 30 minutes without increased pain

## 2024-10-22 ENCOUNTER — TREATMENT (OUTPATIENT)
Dept: PHYSICAL THERAPY | Facility: HOSPITAL | Age: 59
End: 2024-10-22
Payer: MEDICARE

## 2024-10-22 DIAGNOSIS — M79.672 BILATERAL FOOT PAIN: ICD-10-CM

## 2024-10-22 DIAGNOSIS — R26.2 DIFFICULTY WALKING: Primary | ICD-10-CM

## 2024-10-22 DIAGNOSIS — M21.371 FOOT DROP, BILATERAL: ICD-10-CM

## 2024-10-22 DIAGNOSIS — M79.671 BILATERAL FOOT PAIN: ICD-10-CM

## 2024-10-22 DIAGNOSIS — G61.81 CHRONIC INFLAMMATORY DEMYELINATING POLYNEUROPATHY (MULTI): ICD-10-CM

## 2024-10-22 DIAGNOSIS — M76.819 ANTERIOR TIBIALIS TENDINITIS, UNSPECIFIED LATERALITY: ICD-10-CM

## 2024-10-22 DIAGNOSIS — M21.372 FOOT DROP, BILATERAL: ICD-10-CM

## 2024-10-22 PROCEDURE — 97530 THERAPEUTIC ACTIVITIES: CPT | Mod: GP | Performed by: PHYSICAL THERAPIST

## 2024-10-22 PROCEDURE — 97110 THERAPEUTIC EXERCISES: CPT | Mod: GP | Performed by: PHYSICAL THERAPIST

## 2024-10-22 ASSESSMENT — PAIN SCALES - GENERAL: PAINLEVEL_OUTOF10: 3

## 2024-10-22 ASSESSMENT — PAIN - FUNCTIONAL ASSESSMENT: PAIN_FUNCTIONAL_ASSESSMENT: 0-10

## 2024-10-22 NOTE — PROGRESS NOTES
Physical Therapy    Physical Therapy Reassessment    Patient Name: La Donnelly  MRN: [id number]  : 1965   Today's Date: 10/22/2024  Time Calculation  Start Time: 1345  Stop Time: 1430  Time Calculation (min): 45 min  PT Therapeutic Procedures Time Entry  Therapeutic Exercise Time Entry: 20  Therapeutic Activity Time Entry: 25           Visit Number: 6  Auth: 12 visits    Current Problem  Problem List Items Addressed This Visit             ICD-10-CM    Bilateral foot pain M79.671, M79.672    Chronic inflammatory demyelinating polyneuropathy (Multi) G61.81    Difficulty walking - Primary R26.2    Anterior tibialis tendinitis M76.819    Foot drop, bilateral M21.371, M21.372        Subjective   Patient reports that she could be about 60% improved since starting therapy. Having her braces adjusted has improved her tolerance to wearing them up to 5 hours at a time.  She would like to be able to wearing them all day and improve the strength in her legs to allow her to participate in her ADLs (including house work, interacting with the grand kids, and performing yard work). She notes any surace that is not smooth and even is challenging for her.     Precautions  Precautions  STEADI Fall Risk Score (The score of 4 or more indicates an increased risk of falling): 2  Braces Applied: yes  Prosthesis/Orthosis Used: Ankle/Foot orthosis  Precautions Comment: Low fall risk    Pain  Pain Assessment: 0-10  0-10 (Numeric) Pain Score: 3  Pain Location: Foot  Pain Orientation: Right, Left    Objective   Lower Extremity Strength: (5/5 unless noted)  MMT RIGHT LEFT   Hip Flexion 4+ 4+   Hip Extension 4+ 4+   Hip Abduction 4+ 4+   Hip Adduction 5 5   Knee Extension 5 4+   Knee Flexion 5 5   Ankle DF 4 4   Ankle PF 5 5   Ankle INV 4+ 4+   Ankle EV 4- 3+     Lower Extremity ROM: WNL unless documented below:  ROM in Degrees  RIGHT LEFT   Knee Extension -2 -5   Knee Flexion WFL WFL   Ankle DF 8 6   Ankle PF (sitting) 20 20   Ankle INV  "WFL (hyper) WFL (hyper)   Ankle EV -5 -8     Joint mobility: ankle eversion limited, tone in feet create high arch and reduced mobility    Gait mechanics: Patient demonstrates left more than right foot inversion and supination with deficits in knee extension in stance and shortened stride length. Ambulation with hinged AFOs results in improved (reduced) inversion and feet staying more straight forward rather than adducting. Patient did not note significant change in symptoms with ~80 feet of ambulation in clinic       Outcome Measures:  Other Measures  Activities - Specific Balance Confidence Scale: 1175    Treatments:  EXERCISES      Date 10/8/2024  10/15/2024  10/22/2024         VISIT # #4 #5 #6    REPS  Reassessment - 25'         Nustep 12' @ L1 10' @L3    Bike            Shuttle: dlp  Shuttle: slp  Shuttle: TR 4B 20x 5B 20x  L 3b (10x 2) R 4b  2B 2 x 10  5B 2 x 20   R 5b (2 x 10) L 4b   2B 2 x 10           Qhip bilat      Flexion  10# 2 x 10 10# 2 x 10   Abduction  10# 2 x 10 10# 2 x 10         STM      Ankle and Foot Mobilizations      Ankle ROM/Stretching            Ankle PF/DF/EV       RB a/p  RB m/l  RB Stretch 2 x 10   2 x 10  30\" x 3     30\" x 3 2 x 10  2 x 10  30\" x 3         Clamshells 2 x 10             Hold until adjusted           Sciatic NRS/Flossing            HEP  Gait assess w/ guido AFO Progressed     Access Code: [email or web address]  URL: [email or web address]  Date: 10/22/2024  Prepared by: Bora Morales    Exercises  - Standing Hip Flexion March  - 1 x daily - 2 sets - 10 reps - 1 sec hold  - Standing Repeated Hip Flexion with Ankle Weight  - 1 x daily - 2 sets - 10 reps - 1 sec hold  - Standing Hip Flexion with Resistance Loop  - 1 x daily - 2 sets - 10 reps - 1 sec hold    Assessment:     Patient reports 3/10 pain after treatment today. Patient is doing well with strengthening and balance exercises. All goals are showing gains. Patient is appropriate to continue skilled PT to " address remaining deficits and improve strength and endurance for ADLs and community mobility.    Plan:  OP PT Plan  Treatment/Interventions: Education/ Instruction, Therapeutic exercises, Therapeutic activities, Self care/ home management, Orthosis fit/ training, Neuromuscular re-education, Manual therapy, Gait training, Electrical stimulation, Cryotherapy  PT Plan: Skilled PT  PT Frequency: 1 time per week  Duration: 4 more weeks  Certification Period Start Date:  (Requires Auth)  Number of Treatments Authorized: Requires auth  Rehab Potential: Excellent  Plan of Care Agreement: Patient    Goals:  Active       PT Problem       PT Goal 1 (Met)       Start:  08/26/24    Expected End:  09/09/24    Resolved:  10/22/24    Patient reports reduced pain to </= 3/10 with ambulating with AFOs up to 10-15 minutes           PT Goal 2 (Progressing)       Start:  08/26/24    Expected End:  11/22/24       Independent home exercise program with 90% teach back capability by the patient           PT Goal 3 (Progressing)       Start:  08/26/24    Expected End:  11/22/24       Improve MMT of LE deficits to 4/5 or better to improve joint stability with ambulation           PT Goal 4 (Progressing)       Start:  08/26/24    Expected End:  11/22/24       Functional Outcome ABC score improves to >/= 1300 (initially 1160)           Patient Stated Goal 1 (Progressing)       Start:  08/26/24    Expected End:  11/22/24       Able to walk in home and yard or while grocery shopping up to 30 minutes without increased pain      Goal Note       Met for home; yard is still difficult, and has not yet attempted shopping

## 2024-10-29 ENCOUNTER — TREATMENT (OUTPATIENT)
Dept: PHYSICAL THERAPY | Facility: HOSPITAL | Age: 59
End: 2024-10-29
Payer: MEDICARE

## 2024-10-29 DIAGNOSIS — G61.81 CHRONIC INFLAMMATORY DEMYELINATING POLYNEUROPATHY (MULTI): ICD-10-CM

## 2024-10-29 DIAGNOSIS — M79.671 BILATERAL FOOT PAIN: ICD-10-CM

## 2024-10-29 DIAGNOSIS — M21.372 FOOT DROP, BILATERAL: ICD-10-CM

## 2024-10-29 DIAGNOSIS — M79.672 BILATERAL FOOT PAIN: ICD-10-CM

## 2024-10-29 DIAGNOSIS — R26.2 DIFFICULTY WALKING: Primary | ICD-10-CM

## 2024-10-29 DIAGNOSIS — M76.819 ANTERIOR TIBIALIS TENDINITIS, UNSPECIFIED LATERALITY: ICD-10-CM

## 2024-10-29 DIAGNOSIS — M21.371 FOOT DROP, BILATERAL: ICD-10-CM

## 2024-10-29 PROCEDURE — 97110 THERAPEUTIC EXERCISES: CPT | Mod: GP,CQ

## 2024-10-29 ASSESSMENT — PAIN SCALES - GENERAL: PAINLEVEL_OUTOF10: 3

## 2024-10-29 ASSESSMENT — PAIN - FUNCTIONAL ASSESSMENT: PAIN_FUNCTIONAL_ASSESSMENT: 0-10

## 2024-11-05 ENCOUNTER — APPOINTMENT (OUTPATIENT)
Dept: PHYSICAL THERAPY | Facility: HOSPITAL | Age: 59
End: 2024-11-05
Payer: MEDICARE

## 2024-11-05 ENCOUNTER — DOCUMENTATION (OUTPATIENT)
Dept: PHYSICAL THERAPY | Facility: HOSPITAL | Age: 59
End: 2024-11-05
Payer: MEDICARE

## 2024-11-12 ENCOUNTER — TREATMENT (OUTPATIENT)
Dept: PHYSICAL THERAPY | Facility: HOSPITAL | Age: 59
End: 2024-11-12
Payer: MEDICARE

## 2024-11-12 DIAGNOSIS — M79.671 BILATERAL FOOT PAIN: ICD-10-CM

## 2024-11-12 DIAGNOSIS — M21.371 FOOT DROP, BILATERAL: ICD-10-CM

## 2024-11-12 DIAGNOSIS — M21.372 FOOT DROP, BILATERAL: ICD-10-CM

## 2024-11-12 DIAGNOSIS — M76.819 ANTERIOR TIBIALIS TENDINITIS, UNSPECIFIED LATERALITY: ICD-10-CM

## 2024-11-12 DIAGNOSIS — G61.81 CHRONIC INFLAMMATORY DEMYELINATING POLYNEUROPATHY (MULTI): ICD-10-CM

## 2024-11-12 DIAGNOSIS — M79.672 BILATERAL FOOT PAIN: ICD-10-CM

## 2024-11-12 DIAGNOSIS — R26.2 DIFFICULTY WALKING: ICD-10-CM

## 2024-11-12 PROCEDURE — 97110 THERAPEUTIC EXERCISES: CPT | Mod: GP,CQ

## 2024-11-12 ASSESSMENT — PAIN SCALES - GENERAL: PAINLEVEL_OUTOF10: 3

## 2024-11-12 ASSESSMENT — PAIN - FUNCTIONAL ASSESSMENT: PAIN_FUNCTIONAL_ASSESSMENT: 0-10

## 2024-11-12 NOTE — PROGRESS NOTES
Physical Therapy    Physical Therapy Treatment/ Discharge    Patient Name: La Donnelly  MRN: [id number]  : 1965  Today's Date: 2024  Time Calculation  Start Time: 145  Stop Time: 230  Time Calculation (min): 45 min    PT Therapeutic Procedures Time Entry  Therapeutic Exercise Time Entry: 45          VISIT:# 8    Current Problem  Problem List Items Addressed This Visit             ICD-10-CM    Bilateral foot pain M79.671, M79.672    Chronic inflammatory demyelinating polyneuropathy (Multi) G61.81    Difficulty walking R26.2    Anterior tibialis tendinitis M76.819    Foot drop, bilateral M21.371, M21.372        Subjective   No falls reported since last visit.  Pt is going to look into a place that accepts silver sneakers.  Pt is wearing her AFO's about 4.5-5 hours then takes a break for about an hour and then she can wear them about another 4 hours.   Reason for Referral: ankle pain, difficulty walking  Referred By: Dr. Mara Jansen  Past Medical History Relevant to Rehab: demyelinating polyneuropathy  Precautions  Precautions  STEADI Fall Risk Score (The score of 4 or more indicates an increased risk of falling): 2  Braces Applied: yes  Prosthesis/Orthosis Used: Ankle/Foot orthosis  Precautions Comment: Low fall risk       Pain  Pain Assessment: 0-10  0-10 (Numeric) Pain Score: 3  Pain Location: Foot  Pain Orientation: Right, Left       Objective   Other Measures  Activities - Specific Balance Confidence Scale: 1250   Patient is independent with current HEP.    Pt is able to walk for shopping 360' without increased pain since AFO adjustments.     Ankle Right (Degrees) Left (Degrees)   DF 0 0   INV 45 45   EV 10 10   PF 50 50     Ankle Right Left   DF 5 5   INV 4+ 4+   EV 4+ 4-           Treatments:  EXERCISES      Date 10/22/24 10/29/24 2024          VISIT # #6 #7 #8    Reassessment - 25'           Nustep  10' L3 10' @L3   Bike            Shuttle: dlp  Shuttle: slp  Shuttle: TR  5B 2x20  5B  "2x10 ea  3B 2x10          Qhip bilat      Flexion 10# 2 x 10 10# 2X10 3 x 10 #15   Abduction 10# 2 x 10 10# 2X10 3 x 10 #15   Extension  10# 2x10 2 x 10 #15   STM      Ankle and Foot Mobilizations      Ankle ROM/Stretching            Ankle PF/DF/EV       RB a/p  RB m/l  RB Stretch 2 x 10  2 x 10  30\" x 3 2X10  2X10  30\" X 3  2X10  2X10  30\" X 3          Clamshells                        Sciatic NRS/Flossing            HEP Progressed         Assessment:   Pt is ready for discharge.  POC was discussed with PT Torsten        Plan: Discharged on this date.   OP PT Plan  PT Plan: Skilled PT, No Additional PT interventions required at this time  Rehab Potential: Excellent  Plan of Care Agreement: Patient    Goals:  Active       PT Problem       PT Goal 1 (Met)       Start:  08/26/24    Expected End:  09/09/24    Resolved:  10/22/24    Patient reports reduced pain to </= 3/10 with ambulating with AFOs up to 10-15 minutes           PT Goal 2 (Met)       Start:  08/26/24    Expected End:  11/22/24    Resolved:  11/12/24    Independent home exercise program with 90% teach back capability by the patient           PT Goal 3 (Progressing)       Start:  08/26/24    Expected End:  11/22/24       Improve MMT of LE deficits to 4/5 or better to improve joint stability with ambulation           PT Goal 4 (Progressing)       Start:  08/26/24    Expected End:  11/22/24       Functional Outcome ABC score improves to >/= 1300 (initially 1160)        Goal Note       1250              Patient Stated Goal 1 (Met)       Start:  08/26/24    Expected End:  11/22/24    Resolved:  11/12/24    Able to walk in home and yard or while grocery shopping up to 30 minutes without increased pain            "

## 2024-11-14 ENCOUNTER — OFFICE VISIT (OUTPATIENT)
Dept: NEUROLOGY | Facility: CLINIC | Age: 59
End: 2024-11-14
Payer: MEDICARE

## 2024-11-14 VITALS
HEART RATE: 83 BPM | SYSTOLIC BLOOD PRESSURE: 158 MMHG | HEIGHT: 69 IN | BODY MASS INDEX: 24.88 KG/M2 | DIASTOLIC BLOOD PRESSURE: 83 MMHG | RESPIRATION RATE: 18 BRPM | WEIGHT: 168 LBS

## 2024-11-14 DIAGNOSIS — G61.81 CHRONIC INFLAMMATORY DEMYELINATING POLYNEUROPATHY (MULTI): Primary | ICD-10-CM

## 2024-11-14 PROCEDURE — 99214 OFFICE O/P EST MOD 30 MIN: CPT | Performed by: PSYCHIATRY & NEUROLOGY

## 2024-11-14 PROCEDURE — 3008F BODY MASS INDEX DOCD: CPT | Performed by: PSYCHIATRY & NEUROLOGY

## 2024-11-14 PROCEDURE — 3079F DIAST BP 80-89 MM HG: CPT | Performed by: PSYCHIATRY & NEUROLOGY

## 2024-11-14 PROCEDURE — 3077F SYST BP >= 140 MM HG: CPT | Performed by: PSYCHIATRY & NEUROLOGY

## 2024-11-14 ASSESSMENT — PAIN SCALES - GENERAL: PAINLEVEL_OUTOF10: 3
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
## 2024-11-19 ENCOUNTER — APPOINTMENT (OUTPATIENT)
Dept: PHYSICAL THERAPY | Facility: HOSPITAL | Age: 59
End: 2024-11-19
Payer: MEDICARE

## 2024-11-26 ENCOUNTER — APPOINTMENT (OUTPATIENT)
Dept: PHYSICAL THERAPY | Facility: HOSPITAL | Age: 59
End: 2024-11-26
Payer: MEDICARE

## 2024-12-03 ENCOUNTER — APPOINTMENT (OUTPATIENT)
Dept: PHYSICAL THERAPY | Facility: HOSPITAL | Age: 59
End: 2024-12-03
Payer: MEDICARE

## 2025-01-13 DIAGNOSIS — M25.50 ARTHRALGIA, UNSPECIFIED JOINT: ICD-10-CM

## 2025-01-13 DIAGNOSIS — R29.898 WEAKNESS OF BOTH LOWER EXTREMITIES: ICD-10-CM

## 2025-01-13 DIAGNOSIS — M25.50 PAIN IN UNSPECIFIED JOINT: ICD-10-CM

## 2025-01-13 RX ORDER — HYDROCODONE BITARTRATE AND ACETAMINOPHEN 7.5; 325 MG/1; MG/1
1 TABLET ORAL EVERY 6 HOURS PRN
Qty: 120 TABLET | Refills: 0 | Status: SHIPPED | OUTPATIENT
Start: 2025-01-16 | End: 2025-02-15

## 2025-01-13 RX ORDER — LOSARTAN POTASSIUM AND HYDROCHLOROTHIAZIDE 25; 100 MG/1; MG/1
1 TABLET ORAL DAILY
Qty: 90 TABLET | Refills: 3 | Status: SHIPPED | OUTPATIENT
Start: 2025-01-13

## 2025-02-11 PROBLEM — J45.909 ASTHMA: Status: RESOLVED | Noted: 2023-04-18 | Resolved: 2025-02-11

## 2025-02-11 PROBLEM — J44.1 CHRONIC OBSTRUCTIVE PULMONARY DISEASE WITH ACUTE EXACERBATION (MULTI): Status: RESOLVED | Noted: 2024-04-02 | Resolved: 2025-02-11

## 2025-02-11 PROBLEM — J40 BRONCHITIS: Status: RESOLVED | Noted: 2024-10-14 | Resolved: 2025-02-11

## 2025-02-11 PROBLEM — M21.541 ACQUIRED EQUINOVARUS DEFORMITY OF RIGHT FOOT: Status: RESOLVED | Noted: 2023-04-24 | Resolved: 2025-02-11

## 2025-02-11 PROBLEM — R91.8 ABNORMAL CT SCAN OF LUNG: Status: RESOLVED | Noted: 2023-04-18 | Resolved: 2025-02-11

## 2025-02-11 PROBLEM — M21.542 ACQUIRED EQUINOVARUS DEFORMITY OF LEFT FOOT: Status: RESOLVED | Noted: 2023-04-24 | Resolved: 2025-02-11

## 2025-02-11 PROBLEM — E66.3 OVERWEIGHT WITH BODY MASS INDEX (BMI) 25.0-29.9: Status: RESOLVED | Noted: 2024-10-14 | Resolved: 2025-02-11

## 2025-02-11 PROBLEM — J40 BRONCHITIS, COMPLICATED: Status: RESOLVED | Noted: 2023-04-18 | Resolved: 2025-02-11

## 2025-02-11 PROBLEM — G61.81 CHRONIC INFLAMMATORY DEMYELINATING POLYRADICULONEUROPATHY (MULTI): Status: RESOLVED | Noted: 2024-10-14 | Resolved: 2025-02-11

## 2025-02-12 ENCOUNTER — APPOINTMENT (OUTPATIENT)
Dept: PRIMARY CARE | Facility: CLINIC | Age: 60
End: 2025-02-12
Payer: MEDICARE

## 2025-02-12 VITALS
SYSTOLIC BLOOD PRESSURE: 122 MMHG | HEART RATE: 84 BPM | DIASTOLIC BLOOD PRESSURE: 82 MMHG | BODY MASS INDEX: 25.62 KG/M2 | HEIGHT: 69 IN | WEIGHT: 173 LBS | RESPIRATION RATE: 16 BRPM | OXYGEN SATURATION: 98 %

## 2025-02-12 DIAGNOSIS — M54.2 CERVICALGIA: ICD-10-CM

## 2025-02-12 DIAGNOSIS — I10 ELEVATED BLOOD PRESSURE READING WITH DIAGNOSIS OF HYPERTENSION: ICD-10-CM

## 2025-02-12 DIAGNOSIS — M25.50 ARTHRALGIA, UNSPECIFIED JOINT: ICD-10-CM

## 2025-02-12 DIAGNOSIS — M06.9 RHEUMATOID ARTHRITIS INVOLVING MULTIPLE SITES, UNSPECIFIED WHETHER RHEUMATOID FACTOR PRESENT (MULTI): ICD-10-CM

## 2025-02-12 DIAGNOSIS — Z79.899 MEDICATION MANAGEMENT: ICD-10-CM

## 2025-02-12 DIAGNOSIS — F11.20 UNCOMPLICATED OPIOID DEPENDENCE (MULTI): ICD-10-CM

## 2025-02-12 DIAGNOSIS — E78.5 BORDERLINE HYPERLIPIDEMIA: ICD-10-CM

## 2025-02-12 DIAGNOSIS — R29.898 WEAKNESS OF BOTH LOWER EXTREMITIES: ICD-10-CM

## 2025-02-12 DIAGNOSIS — F17.211 NICOTINE DEPENDENCE, CIGARETTES, IN REMISSION: ICD-10-CM

## 2025-02-12 DIAGNOSIS — G82.20 PARAPARESIS (MULTI): ICD-10-CM

## 2025-02-12 DIAGNOSIS — G89.29 OTHER CHRONIC PAIN: Chronic | ICD-10-CM

## 2025-02-12 DIAGNOSIS — Z00.00 ROUTINE GENERAL MEDICAL EXAMINATION AT HEALTH CARE FACILITY: Primary | ICD-10-CM

## 2025-02-12 DIAGNOSIS — G61.81 CHRONIC INFLAMMATORY DEMYELINATING POLYNEUROPATHY (MULTI): ICD-10-CM

## 2025-02-12 PROCEDURE — 3079F DIAST BP 80-89 MM HG: CPT | Performed by: FAMILY MEDICINE

## 2025-02-12 PROCEDURE — G0439 PPPS, SUBSEQ VISIT: HCPCS | Performed by: FAMILY MEDICINE

## 2025-02-12 PROCEDURE — 3074F SYST BP LT 130 MM HG: CPT | Performed by: FAMILY MEDICINE

## 2025-02-12 PROCEDURE — 3008F BODY MASS INDEX DOCD: CPT | Performed by: FAMILY MEDICINE

## 2025-02-12 RX ORDER — HYDROCODONE BITARTRATE AND ACETAMINOPHEN 7.5; 325 MG/1; MG/1
1 TABLET ORAL EVERY 6 HOURS PRN
Qty: 120 TABLET | Refills: 0 | Status: SHIPPED | OUTPATIENT
Start: 2025-04-16 | End: 2025-05-16

## 2025-02-12 RX ORDER — HYDROCODONE BITARTRATE AND ACETAMINOPHEN 7.5; 325 MG/1; MG/1
1 TABLET ORAL EVERY 6 HOURS PRN
Qty: 120 TABLET | Refills: 0 | Status: SHIPPED | OUTPATIENT
Start: 2025-02-15 | End: 2025-03-17

## 2025-02-12 RX ORDER — CYCLOBENZAPRINE HCL 10 MG
10 TABLET ORAL 2 TIMES DAILY PRN
Qty: 180 TABLET | Refills: 1 | Status: SHIPPED | OUTPATIENT
Start: 2025-02-12

## 2025-02-12 RX ORDER — HYDROCODONE BITARTRATE AND ACETAMINOPHEN 7.5; 325 MG/1; MG/1
1 TABLET ORAL EVERY 6 HOURS PRN
Qty: 120 TABLET | Refills: 0 | Status: SHIPPED | OUTPATIENT
Start: 2025-03-17 | End: 2025-04-16

## 2025-02-12 ASSESSMENT — ANXIETY QUESTIONNAIRES
5. BEING SO RESTLESS THAT IT IS HARD TO SIT STILL: NOT AT ALL
7. FEELING AFRAID AS IF SOMETHING AWFUL MIGHT HAPPEN: NOT AT ALL
1. FEELING NERVOUS, ANXIOUS, OR ON EDGE: NOT AT ALL
3. WORRYING TOO MUCH ABOUT DIFFERENT THINGS: NOT AT ALL
GAD7 TOTAL SCORE: 0
2. NOT BEING ABLE TO STOP OR CONTROL WORRYING: NOT AT ALL
4. TROUBLE RELAXING: NOT AT ALL
6. BECOMING EASILY ANNOYED OR IRRITABLE: NOT AT ALL
IF YOU CHECKED OFF ANY PROBLEMS ON THIS QUESTIONNAIRE, HOW DIFFICULT HAVE THESE PROBLEMS MADE IT FOR YOU TO DO YOUR WORK, TAKE CARE OF THINGS AT HOME, OR GET ALONG WITH OTHER PEOPLE: NOT DIFFICULT AT ALL

## 2025-02-12 ASSESSMENT — ENCOUNTER SYMPTOMS
DEPRESSION: 0
OCCASIONAL FEELINGS OF UNSTEADINESS: 0
ARTHRALGIAS: 1
BACK PAIN: 1
LOSS OF SENSATION IN FEET: 0

## 2025-02-12 ASSESSMENT — ACTIVITIES OF DAILY LIVING (ADL)
BATHING: INDEPENDENT
MANAGING_FINANCES: INDEPENDENT
DRESSING: INDEPENDENT
DOING_HOUSEWORK: INDEPENDENT
TAKING_MEDICATION: INDEPENDENT
GROCERY_SHOPPING: INDEPENDENT

## 2025-02-12 ASSESSMENT — PATIENT HEALTH QUESTIONNAIRE - PHQ9
1. LITTLE INTEREST OR PLEASURE IN DOING THINGS: NOT AT ALL
SUM OF ALL RESPONSES TO PHQ9 QUESTIONS 1 AND 2: 0
2. FEELING DOWN, DEPRESSED OR HOPELESS: NOT AT ALL

## 2025-02-12 NOTE — PATIENT INSTRUCTIONS
There is Dr. Doe in [place], and [facility] in [place], there is [facility]. We have Dr. Justin Bhatt here.

## 2025-02-12 NOTE — ASSESSMENT & PLAN NOTE
Orders:    HYDROcodone-acetaminophen (Norco) 7.5-325 mg tablet; Take 1 tablet by mouth every 6 hours if needed for severe pain (7 - 10). Do not fill before April 16, 2025.    HYDROcodone-acetaminophen (Norco) 7.5-325 mg tablet; Take 1 tablet by mouth every 6 hours if needed for severe pain (7 - 10). Do not fill before March 17, 2025.    CBC and Auto Differential; Future    Comprehensive Metabolic Panel; Future    Lipid Panel; Future    TSH with reflex to Free T4 if abnormal; Future    Follow Up In [facility] - PCP - Established; Future

## 2025-02-12 NOTE — ADDENDUM NOTE
Addended by: CHERIE GUERRA on: 2/12/2025 12:46 PM     Modules accepted: Orders    
Addended by: CHERIE GUERRA on: 2/12/2025 12:50 PM     Modules accepted: Orders    
Addended by: ROM ATKINS on: 2/12/2025 12:33 PM     Modules accepted: Orders    
Addended by: ROM TAKINS on: 2/12/2025 12:48 PM     Modules accepted: Orders    
Yes

## 2025-02-12 NOTE — ASSESSMENT & PLAN NOTE
Orders:    Follow Up In [facility] - PCP - Established    HYDROcodone-acetaminophen (Norco) 7.5-325 mg tablet; Take 1 tablet by mouth every 6 hours if needed for severe pain (7 - 10). Do not fill before April 16, 2025.    HYDROcodone-acetaminophen (Norco) 7.5-325 mg tablet; Take 1 tablet by mouth every 6 hours if needed for severe pain (7 - 10). Do not fill before March 17, 2025.

## 2025-02-12 NOTE — ASSESSMENT & PLAN NOTE
Orders:    HYDROcodone-acetaminophen (Norco) 7.5-325 mg tablet; Take 1 tablet by mouth every 6 hours if needed for severe pain (7 - 10). Do not fill before February 15, 2025.    CBC and Auto Differential; Future    Comprehensive Metabolic Panel; Future    Lipid Panel; Future    TSH with reflex to Free T4 if abnormal; Future

## 2025-02-12 NOTE — ASSESSMENT & PLAN NOTE
Orders:    cyclobenzaprine (Flexeril) 10 mg tablet; Take 1 tablet (10 mg) by mouth 2 times a day as needed for muscle spasms.

## 2025-02-12 NOTE — PROGRESS NOTES
"Subjective   Reason for Visit: La Donnelly is an 59 y.o. female here for a Medicare Wellness visit.     Past Medical, Surgical, and Family History reviewed and updated in chart.    Reviewed all medications by prescribing practitioner or clinical pharmacist (such as prescriptions, OTCs, herbal therapies and supplements) and documented in the medical record.    HPI  59 year old female for three months followup. Pain medications secondary to CIDP, she is not traveling, pain is stable 4/5, therapy with braces, tolerance has increased to wear braces to 4-5 hours  Patient Care Team:  Laura Ward MD as PCP - General (Family Medicine)     Review of Systems   Musculoskeletal:  Positive for arthralgias, back pain and gait problem.        Foot drop   All other systems reviewed and are negative.  Jamal retired and she does not have pulmonologist.     Objective   Vitals:  /82   Pulse 84   Resp 16   Ht 1.74 m (5' 8.5\")   Wt 78.5 kg (173 lb)   LMP  (LMP Unknown)   SpO2 98%   BMI 25.92 kg/m²       Physical Exam  Vitals reviewed.   Constitutional:       Appearance: Normal appearance.   HENT:      Head: Normocephalic and atraumatic.      Right Ear: Tympanic membrane normal.      Left Ear: Tympanic membrane normal.      Nose: Nose normal.      Mouth/Throat:      Mouth: Mucous membranes are moist.      Pharynx: Oropharynx is clear.   Eyes:      Extraocular Movements: Extraocular movements intact.      Conjunctiva/sclera: Conjunctivae normal.      Pupils: Pupils are equal, round, and reactive to light.   Cardiovascular:      Rate and Rhythm: Normal rate and regular rhythm.      Pulses: Normal pulses.      Heart sounds: Normal heart sounds.   Pulmonary:      Effort: Pulmonary effort is normal.      Breath sounds: Normal breath sounds.   Abdominal:      General: Abdomen is flat. Bowel sounds are normal.      Palpations: Abdomen is soft.   Musculoskeletal:         General: Normal range of motion.      Cervical " back: Normal range of motion and neck supple.   Skin:     General: Skin is warm and dry.      Capillary Refill: Capillary refill takes less than 2 seconds.   Neurological:      General: No focal deficit present.      Mental Status: She is alert and oriented to person, place, and time. Mental status is at baseline.      Motor: Weakness present.      Coordination: Coordination abnormal.      Gait: Gait abnormal.      Comments: Bilateral foot drop   Psychiatric:         Mood and Affect: Mood normal.         Behavior: Behavior normal.         Assessment & Plan  Other chronic pain    Orders:    Follow Up In [facility] - PCP - Established    HYDROcodone-acetaminophen (Norco) 7.5-325 mg tablet; Take 1 tablet by mouth every 6 hours if needed for severe pain (7 - 10). Do not fill before April 16, 2025.    HYDROcodone-acetaminophen (Norco) 7.5-325 mg tablet; Take 1 tablet by mouth every 6 hours if needed for severe pain (7 - 10). Do not fill before March 17, 2025.    Cervicalgia    Orders:    cyclobenzaprine (Flexeril) 10 mg tablet; Take 1 tablet (10 mg) by mouth 2 times a day as needed for muscle spasms.    Arthralgia, unspecified joint    Orders:    HYDROcodone-acetaminophen (Norco) 7.5-325 mg tablet; Take 1 tablet by mouth every 6 hours if needed for severe pain (7 - 10). Do not fill before February 15, 2025.    CBC and Auto Differential; Future    Comprehensive Metabolic Panel; Future    Lipid Panel; Future    TSH with reflex to Free T4 if abnormal; Future    Weakness of both lower extremities    Orders:    HYDROcodone-acetaminophen (Norco) 7.5-325 mg tablet; Take 1 tablet by mouth every 6 hours if needed for severe pain (7 - 10). Do not fill before February 15, 2025.    Medication management    Orders:    Opiate/Opioid/Benzo Prescription Compliance    Routine general medical examination at health care facility    Orders:    1 Year Follow Up In [facility] - PCP - Wellness Exam; Future    CBC and Auto  Differential; Future    Comprehensive Metabolic Panel; Future    Lipid Panel; Future    TSH with reflex to Free T4 if abnormal; Future    Nicotine dependence, cigarettes, in remission    Orders:    CT lung screening low dose; Future    Chronic inflammatory demyelinating polyneuropathy (Multi)    Orders:    HYDROcodone-acetaminophen (Norco) 7.5-325 mg tablet; Take 1 tablet by mouth every 6 hours if needed for severe pain (7 - 10). Do not fill before April 16, 2025.    HYDROcodone-acetaminophen (Norco) 7.5-325 mg tablet; Take 1 tablet by mouth every 6 hours if needed for severe pain (7 - 10). Do not fill before March 17, 2025.    CBC and Auto Differential; Future    Comprehensive Metabolic Panel; Future    Lipid Panel; Future    TSH with reflex to Free T4 if abnormal; Future    Follow Up In [facility] - PCP - Established; Future    Paraparesis (Multi)         Rheumatoid arthritis involving multiple sites, unspecified whether rheumatoid factor present (Multi)    Orders:    CBC and Auto Differential; Future    Comprehensive Metabolic Panel; Future    Lipid Panel; Future    TSH with reflex to Free T4 if abnormal; Future    Uncomplicated opioid dependence (Multi)         Elevated blood pressure reading with diagnosis of hypertension    Orders:    TSH with reflex to Free T4 if abnormal; Future    Borderline hyperlipidemia    Orders:    Lipid Panel; Future       Patient to get labs and imaging at [facility].

## 2025-02-12 NOTE — ASSESSMENT & PLAN NOTE
Orders:    HYDROcodone-acetaminophen (Norco) 7.5-325 mg tablet; Take 1 tablet by mouth every 6 hours if needed for severe pain (7 - 10). Do not fill before February 15, 2025.

## 2025-02-14 LAB
1OH-MIDAZOLAM UR-MCNC: NEGATIVE NG/ML
7AMINOCLONAZEPAM UR-MCNC: NEGATIVE NG/ML
A-OH ALPRAZ UR-MCNC: NEGATIVE NG/ML
A-OH-TRIAZOLAM UR-MCNC: NEGATIVE NG/ML
AMPHETAMINES UR QL: NEGATIVE NG/ML
BARBITURATES UR QL: NEGATIVE NG/ML
BZE UR QL: NEGATIVE NG/ML
CODEINE UR-MCNC: NEGATIVE NG/ML
CREAT UR-MCNC: 27.2 MG/DL
DRUG SCREEN COMMENT UR-IMP: ABNORMAL
EDDP UR-MCNC: NEGATIVE NG/ML
FENTANYL UR-MCNC: NEGATIVE NG/ML
HYDROCODONE UR-MCNC: 752 NG/ML
HYDROMORPHONE UR-MCNC: 346 NG/ML
LORAZEPAM UR-MCNC: NEGATIVE NG/ML
METHADONE UR-MCNC: NEGATIVE NG/ML
MORPHINE UR-MCNC: NEGATIVE NG/ML
NORDIAZEPAM UR-MCNC: NEGATIVE NG/ML
NORFENTANYL UR-MCNC: NEGATIVE NG/ML
NORHYDROCODONE UR CFM-MCNC: 948 NG/ML
NOROXYCODONE UR CFM-MCNC: NEGATIVE NG/ML
NORTRAMADOL UR-MCNC: NEGATIVE NG/ML
OH-ETHYLFLURAZ UR-MCNC: NEGATIVE NG/ML
OXAZEPAM UR-MCNC: NEGATIVE NG/ML
OXIDANTS UR QL: NEGATIVE MCG/ML
OXYCODONE UR CFM-MCNC: NEGATIVE NG/ML
OXYMORPHONE UR CFM-MCNC: NEGATIVE NG/ML
PCP UR QL: NEGATIVE NG/ML
PH UR: 7.1 [PH] (ref 4.5–9)
QUEST 6 ACETYLMORPHINE: NEGATIVE NG/ML
QUEST NOTES AND COMMENTS: ABNORMAL
QUEST ZOLPIDEM: NEGATIVE NG/ML
TEMAZEPAM UR-MCNC: NEGATIVE NG/ML
THC UR QL: NEGATIVE NG/ML
TRAMADOL UR-MCNC: NEGATIVE NG/ML
ZOLPIDEM PHENYL-4-CARB UR CFM-MCNC: NEGATIVE NG/ML

## 2025-03-25 ENCOUNTER — TELEPHONE (OUTPATIENT)
Dept: PRIMARY CARE | Facility: CLINIC | Age: 60
End: 2025-03-25
Payer: MEDICARE

## 2025-03-25 DIAGNOSIS — N30.00 ACUTE CYSTITIS WITHOUT HEMATURIA: Primary | ICD-10-CM

## 2025-03-25 RX ORDER — CIPROFLOXACIN 500 MG/1
500 TABLET ORAL 2 TIMES DAILY
Qty: 10 TABLET | Refills: 0 | Status: SHIPPED | OUTPATIENT
Start: 2025-03-25 | End: 2025-03-30

## 2025-03-25 NOTE — TELEPHONE ENCOUNTER
Patient called took a home UTI test came back positive for a UTI patient requesting medication please advise     [id number] - [place], [place] - [address]   [address], [place] [address]  Phone: [phone]  Fax: [phone]  SHAHLA #: [id number]

## 2025-04-07 VITALS — WEIGHT: 173 LBS | HEIGHT: 69 IN | BODY MASS INDEX: 25.62 KG/M2

## 2025-04-07 ASSESSMENT — PAIN SCALES - GENERAL: PAINLEVEL_OUTOF10: 3

## 2025-04-07 NOTE — PROGRESS NOTES
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 La Donnelly is a 59 year old woman  whom was seen today for her scheduled virtual follow up appointment with both audio/video, regarding her chronic inflammatory demyelinating polyneuropathy (CIDP). She was previously seen in person on 11/14/2024.    During her previous appointment, she continued having more noticeable issues with her feet L>R.  This began when wearing her previous AFO's which would rub on the sides of her feet and ankles and cause discomfort.  She has received and was wearing them during her last appointment. She feels that she is still trying to get used to wearing them and can only keep them on for about 5 hours per day.   Her ankle medial rotation continues despite completing her PT sessions.  Her therapist felt this rotation may be due to hip weakness and may also be  the reason for the heaviness to her legs she experiences by the end of the day.      Pain to her feet bilaterally  increased and she has noticed an intermittent cold sensation from her toes to mid calf's.  Toes continue to curl at night and continues to use the frame of her bed to stabilize and straighten.  She denies any falls and continues to be able to walk without any assistance.  Numbness has not worsened and denies any new weakness. She denies any issue with her arms and hands.        She continues to receive SQIG weekly through [employer] home infusion company. She has been tolerating her dose well.  She continues on her low dose of prednisone 2 mg daily.  Her treatments, both IVIG and small dose of prednisone have helped to improve and stabilize her symptoms.  She has not had an exacerbation to her symptoms since her treatments were started and a fluctuation of symptoms occurred when she was changed to IV.  This improved  "immediately when subcutaneous was restarted.    She has had difficulty sleeping at night and recently started to take magnesium, hoping this will improve.    To recap, she developed in 2007 numbness and pain in both legs followed by significant weakness after she received 2 doses of intravenous Enbrel (etanercept) for rheumatoid arthritis.  She became wheelchair-bound and had severe neuropathy.  She was admitted to [facility], had severe weakness and areflexia and following an EMG which showed significant demyelinating neuropathy, she was diagnosed with chronic inflammatory demyelinating polyneuropathy.  She was started on IVIG and corticosteroids.  She improved significantly but continues to have some residual weakness in both legs.  Her prednisone was ultimately tapered and she continued on pulse IVIG.  She had an increase in weakness in the legs in early 2022. Her neurological examination was unchanged. Her EMG study in May 2022 did not show evidence of active denervation or demyelination. There are finding of secondary axonal loss. This was slightly better than her last EMG done in 2015 although none has been done since.  She has improved since her prednisone was increased from 3 mg to 15 mg in May 2022.  This was slowly reduced back to 2 mg daily.    Otherwise, the past medical history, social history, and review of systems were reviewed. There are no significant changes.     Ht 1.74 m (5' 8.5\")   Wt 78.5 kg (173 lb)   LMP  (LMP Unknown)   BMI 25.92 kg/m²       Neuromuscular Exam:     The neurological examination was limited since this was a telemedicine visit.  She was alert with normal speech, cognition and fund of knowledge.    Impression:  La Donnelly is a 59 y.o. with Enbrel (etanercept)-induced CIDP since 2007. She had responded very well to pulse IVIG with some fluctuation. Because of poor venous access, she was switched in the spring 2020 to subcutaneous " immunoglobulin.     She is doing well on subcutaneous Ig. She has reduced her prednisone to 2 mg daily.  She has received bilateral AFOs which has helped significantly her ankle weakness particularly with inversion tendencies. She will ask her primary MD to get a DEXA scan.    Plan:    1. Continue S/Q Ig 22 g weekly.  2. Continue prednisone 2 mg daily.   3. Continue vitamin D and calcium.   4. Continue to use bilateral AFO to improve ankle inversion tendencies.  5. Ask primary MD to get a DEXA scan.    She reported in 6 months.  She will call for any questions.    Hermilo Mosley M.D., F.A.C.P.   Director, [facility] & EMG laboratory   [facility]   [facility]   Professor of Neurology   [employer] [facility]    The total appointment time today was 30 minutes. Time included preparing to see the patient, obtaining the history, performing a medically necessary appropriate physical examination, counseling and educating the patient and documenting clinical information in the medical record.    Virtual or Telephone Consent    An interactive audio and video telecommunication system which permits real time communications between the patient (at the originating site) and provider (at the distant site) was utilized to provide this telehealth service.   Verbal consent was requested and obtained from La Donnelly on this date, 04/08/25 for a telehealth visit and the patient's location was confirmed at the time of the visit.

## 2025-04-08 ENCOUNTER — TELEMEDICINE (OUTPATIENT)
Dept: NEUROLOGY | Facility: HOSPITAL | Age: 60
End: 2025-04-08
Payer: MEDICARE

## 2025-04-08 DIAGNOSIS — G61.81 CIDP (CHRONIC INFLAMMATORY DEMYELINATING POLYNEUROPATHY) (MULTI): Primary | ICD-10-CM

## 2025-04-08 PROCEDURE — 99214 OFFICE O/P EST MOD 30 MIN: CPT | Performed by: PSYCHIATRY & NEUROLOGY

## 2025-04-08 PROCEDURE — 3008F BODY MASS INDEX DOCD: CPT | Performed by: PSYCHIATRY & NEUROLOGY

## 2025-04-15 DIAGNOSIS — G61.81 CIDP (CHRONIC INFLAMMATORY DEMYELINATING POLYNEUROPATHY) (MULTI): ICD-10-CM

## 2025-04-15 RX ORDER — PREDNISONE 1 MG/1
2 TABLET ORAL DAILY
COMMUNITY
End: 2025-04-15 | Stop reason: SDUPTHER

## 2025-04-16 RX ORDER — PREDNISONE 1 MG/1
2 TABLET ORAL DAILY
Qty: 180 TABLET | Refills: 3 | Status: SHIPPED | OUTPATIENT
Start: 2025-04-16 | End: 2026-04-16

## 2025-05-27 DIAGNOSIS — G61.81 CHRONIC INFLAMMATORY DEMYELINATING POLYNEUROPATHY (MULTI): ICD-10-CM

## 2025-05-27 DIAGNOSIS — M79.18 MYOFASCIAL MUSCLE PAIN: Primary | ICD-10-CM

## 2025-05-27 RX ORDER — HYDROCODONE BITARTRATE AND ACETAMINOPHEN 7.5; 325 MG/1; MG/1
1 TABLET ORAL EVERY 6 HOURS PRN
Qty: 120 TABLET | Refills: 0 | Status: SHIPPED | OUTPATIENT
Start: 2025-05-27

## 2025-05-27 RX ORDER — HYDROCODONE BITARTRATE AND ACETAMINOPHEN 7.5; 325 MG/1; MG/1
1 TABLET ORAL EVERY 6 HOURS PRN
COMMUNITY
End: 2025-05-27 | Stop reason: SDUPTHER

## 2025-06-19 ENCOUNTER — TELEPHONE (OUTPATIENT)
Dept: PRIMARY CARE | Facility: CLINIC | Age: 60
End: 2025-06-19
Payer: MEDICARE

## 2025-06-19 DIAGNOSIS — F17.210 CIGARETTE NICOTINE DEPENDENCE WITHOUT COMPLICATION: Primary | ICD-10-CM

## 2025-06-19 NOTE — TELEPHONE ENCOUNTER
Patient  called and needed to schedule a CT low dose long screening but there is no active request in system please  advise

## 2025-06-26 ENCOUNTER — APPOINTMENT (OUTPATIENT)
Dept: PRIMARY CARE | Facility: CLINIC | Age: 60
End: 2025-06-26
Payer: MEDICARE

## 2025-06-26 VITALS
BODY MASS INDEX: 26.51 KG/M2 | HEART RATE: 62 BPM | HEIGHT: 69 IN | WEIGHT: 179 LBS | SYSTOLIC BLOOD PRESSURE: 154 MMHG | OXYGEN SATURATION: 98 % | DIASTOLIC BLOOD PRESSURE: 82 MMHG

## 2025-06-26 DIAGNOSIS — E78.2 COMBINED HYPERLIPIDEMIA: ICD-10-CM

## 2025-06-26 DIAGNOSIS — I10 PRIMARY HYPERTENSION: Primary | ICD-10-CM

## 2025-06-26 DIAGNOSIS — F33.9 DEPRESSION, RECURRENT: ICD-10-CM

## 2025-06-26 DIAGNOSIS — J44.9 CHRONIC OBSTRUCTIVE PULMONARY DISEASE, UNSPECIFIED COPD TYPE (MULTI): ICD-10-CM

## 2025-06-26 DIAGNOSIS — G61.81 CHRONIC INFLAMMATORY DEMYELINATING POLYNEUROPATHY (MULTI): ICD-10-CM

## 2025-06-26 DIAGNOSIS — M79.18 MYOFASCIAL MUSCLE PAIN: ICD-10-CM

## 2025-06-26 PROCEDURE — 3008F BODY MASS INDEX DOCD: CPT | Performed by: FAMILY MEDICINE

## 2025-06-26 PROCEDURE — RXMED WILLOW AMBULATORY MEDICATION CHARGE

## 2025-06-26 PROCEDURE — 3077F SYST BP >= 140 MM HG: CPT | Performed by: FAMILY MEDICINE

## 2025-06-26 PROCEDURE — 3079F DIAST BP 80-89 MM HG: CPT | Performed by: FAMILY MEDICINE

## 2025-06-26 PROCEDURE — 99214 OFFICE O/P EST MOD 30 MIN: CPT | Performed by: FAMILY MEDICINE

## 2025-06-26 PROCEDURE — G2211 COMPLEX E/M VISIT ADD ON: HCPCS | Performed by: FAMILY MEDICINE

## 2025-06-26 RX ORDER — HYDROCODONE BITARTRATE AND ACETAMINOPHEN 7.5; 325 MG/1; MG/1
1 TABLET ORAL EVERY 6 HOURS PRN
Qty: 120 TABLET | Refills: 0 | Status: SHIPPED | OUTPATIENT
Start: 2025-06-27

## 2025-06-26 RX ORDER — CEPHALEXIN 250 MG/1
1 CAPSULE ORAL
Qty: 180 EACH | Refills: 3 | Status: SHIPPED | OUTPATIENT
Start: 2025-06-26

## 2025-06-26 RX ORDER — HYDROCODONE BITARTRATE AND ACETAMINOPHEN 7.5; 325 MG/1; MG/1
1 TABLET ORAL EVERY 6 HOURS PRN
Qty: 120 TABLET | Refills: 0 | Status: SHIPPED | OUTPATIENT
Start: 2025-07-27

## 2025-06-26 RX ORDER — FLUTICASONE PROPIONATE 50 MCG
2 SPRAY, SUSPENSION (ML) NASAL DAILY
Qty: 16 G | Refills: 3 | Status: CANCELLED | OUTPATIENT
Start: 2025-06-26

## 2025-06-26 RX ORDER — HYDROCODONE BITARTRATE AND ACETAMINOPHEN 7.5; 325 MG/1; MG/1
1 TABLET ORAL EVERY 6 HOURS PRN
COMMUNITY
End: 2025-06-26 | Stop reason: SDUPTHER

## 2025-06-26 RX ORDER — HYDROCODONE BITARTRATE AND ACETAMINOPHEN 7.5; 325 MG/1; MG/1
1 TABLET ORAL EVERY 6 HOURS PRN
Qty: 120 TABLET | Refills: 0 | Status: SHIPPED | OUTPATIENT
Start: 2025-08-26

## 2025-06-26 ASSESSMENT — COLUMBIA-SUICIDE SEVERITY RATING SCALE - C-SSRS
2. HAVE YOU ACTUALLY HAD ANY THOUGHTS OF KILLING YOURSELF?: NO
6. HAVE YOU EVER DONE ANYTHING, STARTED TO DO ANYTHING, OR PREPARED TO DO ANYTHING TO END YOUR LIFE?: NO
1. IN THE PAST MONTH, HAVE YOU WISHED YOU WERE DEAD OR WISHED YOU COULD GO TO SLEEP AND NOT WAKE UP?: NO

## 2025-06-26 ASSESSMENT — ENCOUNTER SYMPTOMS
MYALGIAS: 1
WHEEZING: 1
LOSS OF SENSATION IN FEET: 0
ARTHRALGIAS: 1
OCCASIONAL FEELINGS OF UNSTEADINESS: 0
DEPRESSION: 0

## 2025-06-26 NOTE — PATIENT INSTRUCTIONS
Look for drinks that have stevia instead of sucralose.   See if you can set mammogram up for August 1  Medication refilled

## 2025-06-26 NOTE — PROGRESS NOTES
Subjective   Patient ID: La Donnelly is a 59 y.o. female.    HPI  59 year old female for follow up. She states she tried to get labwork done and they refused as it is. Struggled with weight loss and would  like to lower her weight to  a better range. Pulmonologist retired and she would like to get her inhalers from us.  Tried to get lab work done at [facility] and they would not except a requisition without a signature  OARRS:  Laura Ward MD on 6/26/2025 12:38 PM  I have personally reviewed the OARRS report for La Donnelly. I have considered the risks of abuse, dependence, addiction and diversion and I believe that it is clinically appropriate for La Donnelly to be prescribed this medication    Is the patient prescribed a combination of a benzodiazepine and opioid?  No    Last Urine Drug Screen / ordered today: Yes  Recent Results (from the past 8760 hours)   Opiate/Opioid/Benzo Prescription Compliance    Collection Time: 02/12/25 11:50 AM   Result Value Ref Range    Creatinine 27.2 > or = 20.0 mg/dL    pH 7.1 4.5 - 9.0    Oxidant NEGATIVE <200 mcg/mL    Amphetamines NEGATIVE <500 ng/mL    Barbiturates NEGATIVE <300 ng/mL    Cocaine Metabolite NEGATIVE <150 ng/mL    Marijuana Metabolite NEGATIVE <20 ng/mL    Phencyclidine NEGATIVE <25 ng/mL    Alphahydroxyalprazolam NEGATIVE <25 ng/mL    Alphahydroxymidazolam NEGATIVE <50 ng/mL    Alphahydroxytriazolam NEGATIVE <50 ng/mL    Aminoclonazepam NEGATIVE <25 ng/mL    Hydroxyethylflurazepam NEGATIVE <50 ng/mL    Lorazepam NEGATIVE <50 ng/mL    Nordiazepam NEGATIVE <50 ng/mL    Oxazepam NEGATIVE <50 ng/mL    Temazepam NEGATIVE <50 ng/mL    Benzodiazepines Comments      Fentanyl NEGATIVE <0.5 ng/mL    Norfentanyl NEGATIVE <0.5 ng/mL    Fentanyl Comments      6 Acetylmorphine NEGATIVE <10 ng/mL    Heroin Metab Comments      EDDP NEGATIVE <100 ng/mL    Methadone NEGATIVE <100 ng/mL    Methadone Comments      Codeine NEGATIVE <50 ng/mL    Hydrocodone 752 (H)  <50 ng/mL    Hydromorphone 346 (H) <50 ng/mL    Morphine NEGATIVE <50 ng/mL    Norhydrocodone 948 (H) <50 ng/mL    Opiates Comments      Noroxycodone NEGATIVE <50 ng/mL    Oxycodone NEGATIVE <50 ng/mL    Oxymorphone NEGATIVE <50 ng/mL    Oxycodone Comments      Desmethyltramadol NEGATIVE <100 ng/mL    Tramadol NEGATIVE <100 ng/mL    Tramadol Comments      Zolpidem NEGATIVE <5 ng/mL    Zolpidem Metabolite NEGATIVE <5 ng/mL    Zolpidem Comments      Notes and Comments       Results are as expected.       Controlled Substance Agreement:  Date of the Last Agreement: 2/12/2025  Reviewed Controlled Substance Agreement including but not limited to the benefits, risks, and alternatives to treatment with a Controlled Substance medication(s).    Opioids:  What is the patient's goal of therapy? Improved pain  Is this being achieved with current treatment? yes    I have calculated the patient's Morphine Dose Equivalent (MED):   I have considered referral to Pain Management and/or a specialist, and do not feel it is necessary at this time.    I feel that it is clinically indicated to continue this current medication regimen after consideration of alternative therapies, and other non-opioid treatment.    Opioid Risk Screening:  No data recorded    Pain Assessment:  4/10  Analgesia  What was your pain level on average during the past week?: 3  What was your pain level at its worst during the past week?: 7  What percentage of your pain has been relieved during the past week?: 95 %  Is the amount of pain relief you are now obtaining from your current pain relievers enough to make a real difference in your life?: Y    Activities of Daily Living  Physical Functioning: Same  Family Relationships: Same  Social Relationships: Same  Mood: Same  Sleep Patterns: Same  Overall Functioning: Same      Review of Systems   Respiratory:  Positive for wheezing.    Musculoskeletal:  Positive for arthralgias, gait problem and myalgias.   All other  systems reviewed and are negative.      Objective   Physical Exam  Vitals reviewed.   Constitutional:       Appearance: Normal appearance.   HENT:      Head: Normocephalic and atraumatic.      Right Ear: Tympanic membrane normal.      Left Ear: Tympanic membrane normal.      Nose: Nose normal.      Mouth/Throat:      Mouth: Mucous membranes are moist.      Pharynx: Oropharynx is clear.   Eyes:      Extraocular Movements: Extraocular movements intact.      Conjunctiva/sclera: Conjunctivae normal.      Pupils: Pupils are equal, round, and reactive to light.   Cardiovascular:      Rate and Rhythm: Normal rate and regular rhythm.      Pulses: Normal pulses.      Heart sounds: Normal heart sounds.   Pulmonary:      Effort: Pulmonary effort is normal.      Breath sounds: Wheezing present.   Abdominal:      General: Abdomen is flat. Bowel sounds are normal.      Palpations: Abdomen is soft.   Musculoskeletal:         General: Tenderness, deformity and signs of injury present.      Cervical back: Normal range of motion and neck supple.      Comments: AFO on   Skin:     General: Skin is warm and dry.      Capillary Refill: Capillary refill takes less than 2 seconds.   Neurological:      General: No focal deficit present.      Mental Status: She is alert and oriented to person, place, and time.      Gait: Gait abnormal.   Psychiatric:         Mood and Affect: Mood normal.         Behavior: Behavior normal.         Assessment/Plan   Diagnoses and all orders for this visit:  Primary hypertension  -     CBC and Auto Differential; Future  -     Comprehensive Metabolic Panel; Future  -     Lipid Panel; Future  -     TSH with reflex to Free T4 if abnormal; Future  Chronic inflammatory demyelinating polyneuropathy (Multi)  -     Follow Up In [facility] - PCP - Established  -     HYDROcodone-acetaminophen (Norco) 7.5-325 mg tablet; Take 1 tablet by mouth every 6 hours if needed for severe pain (7 - 10). Do not fill before  June 27, 2025.  -     HYDROcodone-acetaminophen (Norco) 7.5-325 mg tablet; Take 1 tablet by mouth every 6 hours if needed for severe pain (7 - 10). Do not fill before July 27, 2025.  -     HYDROcodone-acetaminophen (Norco) 7.5-325 mg tablet; Take 1 tablet by mouth every 6 hours if needed for severe pain (7 - 10). Do not fill before August 26, 2025.  -     CBC and Auto Differential; Future  -     Comprehensive Metabolic Panel; Future  -     Lipid Panel; Future  -     TSH with reflex to Free T4 if abnormal; Future  Myofascial muscle pain  -     HYDROcodone-acetaminophen (Norco) 7.5-325 mg tablet; Take 1 tablet by mouth every 6 hours if needed for severe pain (7 - 10). Do not fill before June 27, 2025.  -     HYDROcodone-acetaminophen (Norco) 7.5-325 mg tablet; Take 1 tablet by mouth every 6 hours if needed for severe pain (7 - 10). Do not fill before July 27, 2025.  -     HYDROcodone-acetaminophen (Norco) 7.5-325 mg tablet; Take 1 tablet by mouth every 6 hours if needed for severe pain (7 - 10). Do not fill before August 26, 2025.  -     CBC and Auto Differential; Future  -     Comprehensive Metabolic Panel; Future  -     Lipid Panel; Future  -     TSH with reflex to Free T4 if abnormal; Future  Chronic obstructive pulmonary disease, unspecified COPD type (Multi)  -     Advair Diskus 100-50 mcg/dose diskus inhaler; Inhale 1 puff by mouth into the lungs 2 times a day.  Depression, recurrent  -     CBC and Auto Differential; Future  -     Comprehensive Metabolic Panel; Future  -     Lipid Panel; Future  -     TSH with reflex to Free T4 if abnormal; Future  Combined hyperlipidemia  -     CBC and Auto Differential; Future  -     Comprehensive Metabolic Panel; Future  -     Lipid Panel; Future  -     TSH with reflex to Free T4 if abnormal; Future    Pain medications refilled.  Would like patient to get a mammogram and a lung screening before return.  Lab orders written to to be  submitted [facility]  Follow up three  months

## 2025-06-27 ENCOUNTER — PHARMACY VISIT (OUTPATIENT)
Dept: PHARMACY | Facility: CLINIC | Age: 60
End: 2025-06-27
Payer: COMMERCIAL

## 2025-06-27 PROCEDURE — RXMED WILLOW AMBULATORY MEDICATION CHARGE

## 2025-07-08 DIAGNOSIS — Z12.31 ENCOUNTER FOR SCREENING MAMMOGRAM FOR BREAST CANCER: ICD-10-CM

## 2025-07-27 ENCOUNTER — PHARMACY VISIT (OUTPATIENT)
Dept: PHARMACY | Facility: CLINIC | Age: 60
End: 2025-07-27
Payer: COMMERCIAL

## 2025-07-27 PROCEDURE — RXMED WILLOW AMBULATORY MEDICATION CHARGE

## 2025-08-01 ENCOUNTER — HOSPITAL ENCOUNTER (OUTPATIENT)
Dept: RADIOLOGY | Facility: HOSPITAL | Age: 60
Discharge: HOME | End: 2025-08-01
Payer: MEDICARE

## 2025-08-01 DIAGNOSIS — F17.210 CIGARETTE NICOTINE DEPENDENCE WITHOUT COMPLICATION: ICD-10-CM

## 2025-08-01 PROCEDURE — 71271 CT THORAX LUNG CANCER SCR C-: CPT

## 2025-08-04 PROBLEM — E78.5 BORDERLINE HYPERLIPIDEMIA: Status: ACTIVE | Noted: 2024-10-14

## 2025-08-26 ENCOUNTER — PHARMACY VISIT (OUTPATIENT)
Dept: PHARMACY | Facility: CLINIC | Age: 60
End: 2025-08-26
Payer: COMMERCIAL

## 2025-08-26 PROCEDURE — RXMED WILLOW AMBULATORY MEDICATION CHARGE

## 2025-08-28 ENCOUNTER — OFFICE VISIT (OUTPATIENT)
Dept: NEUROLOGY | Facility: CLINIC | Age: 60
End: 2025-08-28
Payer: MEDICARE

## 2025-08-28 ASSESSMENT — PAIN SCALES - GENERAL: PAINLEVEL_OUTOF10: 0-NO PAIN

## 2025-09-03 ENCOUNTER — HOSPITAL ENCOUNTER (OUTPATIENT)
Dept: RADIOLOGY | Facility: HOSPITAL | Age: 60
Discharge: HOME | End: 2025-09-03
Payer: MEDICARE

## 2025-09-03 VITALS — BODY MASS INDEX: 25.92 KG/M2 | WEIGHT: 175 LBS | HEIGHT: 69 IN

## 2025-09-03 DIAGNOSIS — Z12.31 ENCOUNTER FOR SCREENING MAMMOGRAM FOR BREAST CANCER: ICD-10-CM

## 2025-09-03 PROCEDURE — 77063 BREAST TOMOSYNTHESIS BI: CPT

## 2025-10-23 ENCOUNTER — APPOINTMENT (OUTPATIENT)
Dept: PRIMARY CARE | Facility: CLINIC | Age: 60
End: 2025-10-23
Payer: MEDICARE

## 2026-02-17 ENCOUNTER — APPOINTMENT (OUTPATIENT)
Dept: PRIMARY CARE | Facility: CLINIC | Age: 61
End: 2026-02-17
Payer: MEDICARE